# Patient Record
Sex: MALE | Race: WHITE | NOT HISPANIC OR LATINO | Employment: OTHER | URBAN - METROPOLITAN AREA
[De-identification: names, ages, dates, MRNs, and addresses within clinical notes are randomized per-mention and may not be internally consistent; named-entity substitution may affect disease eponyms.]

---

## 2019-03-27 ENCOUNTER — OFFICE VISIT (OUTPATIENT)
Dept: PAIN MEDICINE | Facility: CLINIC | Age: 84
End: 2019-03-27
Payer: MEDICARE

## 2019-03-27 VITALS
DIASTOLIC BLOOD PRESSURE: 77 MMHG | BODY MASS INDEX: 27.31 KG/M2 | SYSTOLIC BLOOD PRESSURE: 132 MMHG | HEIGHT: 67 IN | WEIGHT: 174 LBS | HEART RATE: 70 BPM

## 2019-03-27 DIAGNOSIS — M47.896 OTHER SPONDYLOSIS, LUMBAR REGION: Primary | ICD-10-CM

## 2019-03-27 DIAGNOSIS — M51.36 DDD (DEGENERATIVE DISC DISEASE), LUMBAR: ICD-10-CM

## 2019-03-27 PROCEDURE — 99999 PR PBB SHADOW E&M-NEW PATIENT-LVL III: CPT | Mod: PBBFAC,,, | Performed by: ANESTHESIOLOGY

## 2019-03-27 PROCEDURE — 99204 OFFICE O/P NEW MOD 45 MIN: CPT | Mod: S$PBB,,, | Performed by: ANESTHESIOLOGY

## 2019-03-27 PROCEDURE — 99203 OFFICE O/P NEW LOW 30 MIN: CPT | Mod: PBBFAC,PN | Performed by: ANESTHESIOLOGY

## 2019-03-27 PROCEDURE — 99204 PR OFFICE/OUTPT VISIT, NEW, LEVL IV, 45-59 MIN: ICD-10-PCS | Mod: S$PBB,,, | Performed by: ANESTHESIOLOGY

## 2019-03-27 PROCEDURE — 99999 PR PBB SHADOW E&M-NEW PATIENT-LVL III: ICD-10-PCS | Mod: PBBFAC,,, | Performed by: ANESTHESIOLOGY

## 2019-03-27 RX ORDER — BENZONATATE 200 MG/1
CAPSULE ORAL
Refills: 0 | Status: ON HOLD | COMMUNITY
Start: 2019-01-18 | End: 2019-04-29

## 2019-03-27 RX ORDER — AZITHROMYCIN 250 MG/1
TABLET, FILM COATED ORAL
Refills: 0 | Status: ON HOLD | COMMUNITY
Start: 2019-01-18 | End: 2019-04-29

## 2019-03-27 NOTE — H&P (VIEW-ONLY)
This note was completed with dictation software and grammatical errors may exist.    Referring Physician: Jennifer Wright    PCP: Samuel Valenzuela Jr, MD      CC:  Lower back pain    HPI:   Suraj Parrish is a 83 y.o. male presents to us with lower back pain.  Pain has been present for over 20 years on and off.  Pain is gradually worsened over past 6 months.  Presents to us with constant aching, throbbing pain in his lower back.  Pain radiates to his bilateral hips.  He denies any lower extremity radiation.  Pain worsens with standing, walking and getting up.  Pain improves with rest.  He states having history of minimally invasive spine surgery which provided moderate benefit in the past.  He also has history of lumbar ZEYAD performed by Dr. Sawyer at [facility] in 2010 and 2011 with moderate benefit.  He has not had any recent interventions.  He denies any worsening weakness.  No bowel bladder changes.    ROS:  CONSTITUTIONAL: No fevers, chills, night sweats, wt. loss, appetite changes  SKIN: no rashes or itching  ENT: No headaches, head trauma, vision changes, or eye pain  LYMPH NODES: None noticed   CV: No chest pain, palpitations.   RESP: No shortness of breath, dyspnea on exertion, cough, wheezing, or hemoptysis  GI: No nausea, emesis, diarrhea, constipation, melena, hematochezia, pain.    : No dysuria, hematuria, urgency, or frequency   HEME: No easy bruising, bleeding problems  PSYCHIATRIC: No depression, anxiety, psychosis, hallucinations.  NEURO: No seizures, memory loss, dizziness or difficulty sleeping  MSK:  Positive HPI      Past Medical History:   Diagnosis Date    Hyperlipemia      Past Surgical History:   Procedure Laterality Date    APPENDECTOMY  1958    TONSILLECTOMY, ADENOIDECTOMY  1947     Family History   Problem Relation Age of Onset    Parkinsonism Father      Social History     Socioeconomic History    Marital status: Single     Spouse name: None    Number of children: None  "   Years of education: None    Highest education level: None   Occupational History    None   Social Needs    Financial resource strain: None    Food insecurity:     Worry: None     Inability: None    Transportation needs:     Medical: None     Non-medical: None   Tobacco Use    Smoking status: Former Smoker     Packs/day: 2.00     Years: 15.00     Pack years: 30.00     Types: Cigarettes     Last attempt to quit: 10/17/1963     Years since quittin.4    Smokeless tobacco: Never Used   Substance and Sexual Activity    Alcohol use: Yes     Alcohol/week: 4.2 oz     Types: 7 Glasses of wine per week    Drug use: No    Sexual activity: Yes     Partners: Female   Lifestyle    Physical activity:     Days per week: None     Minutes per session: None    Stress: None   Relationships    Social connections:     Talks on phone: None     Gets together: None     Attends Ep[employer] service: None     Active member of club or organization: None     Attends meetings of clubs or organizations: None     Relationship status: None    Intimate partner violence:     Fear of current or ex partner: None     Emotionally abused: None     Physically abused: None     Forced sexual activity: None   Other Topics Concern    None   Social History Narrative    None         Medications/Allergies: See med card    Vitals:    19 0854   BP: 132/77   Pulse: 70   Weight: 78.9 kg (174 lb)   Height: 5' 7" (1.702 m)   PainSc:   8   PainLoc: Back         Physical exam:    GENERAL: A and O x3, the patient appears well groomed and is in no acute distress.  Skin: No rashes or obvious lesions  HEENT: normocephalic, atraumatic  CARDIOVASCULAR:  Palpable peripheral pulses  LUNGS: easy work of breathing  ABDOMEN: soft, nontender   UPPER EXTREMITIES: Normal alignment, normal range of motion, no atrophy, no skin changes,  hair growth and nail growth normal and equal bilaterally. No swelling, no tenderness.    LOWER EXTREMITIES:  Normal " alignment, normal range of motion, no atrophy, no skin changes,  hair growth and nail growth normal and equal bilaterally. No swelling, no tenderness.    LUMBAR SPINE  Lumbar spine: ROM is limited with flexion extension and oblique extension with moderate increased pain.    Michael's test causes no increased pain on either side.    Supine straight leg raise is negative bilaterally.    Internal and external rotation of the hip causes no increased pain on either side.  Myofascial exam: No tenderness to palpation across lumbar paraspinous muscles.      MENTAL STATUS: normal orientation, speech, language, and fund of knowledge for social situation.  Emotional state appropriate.    CRANIAL NERVES:  II:  PERRL bilaterally,   III,IV,VI: EOMI.    V:  Facial sensation equal bilaterally  VII:  Facial motor function normal.  VIII:  Hearing equal to finger rub bilaterally  IX/X: Gag normal, palate symmetric  XI:  Shoulder shrug equal, head turn equal  XII:  Tongue midline without fasciculations      MOTOR: Tone and bulk: normal bilateral upper and lower Strength: normal   Delt Bi Tri WE WF     R 5 5 5 5 5 5   L 5 5 5 5 5 5     IP ADD ABD Quad TA Gas HAM  R 5 5 5 5 5 5 5  L 5 5 5 5 5 5 5    SENSATION: Light touch and pinprick intact bilaterally  REFLEXES: normal, symmetric, nonbrisk.  Toes down, no clonus. No hoffmans.  GAIT: normal rise, base, steps, and arm swing.        Imaging:  Xray L-spine 2011  A intervertebral joint space narrowing is   noted at the L4-5 and the L5 S1 level similar to previous examination.    Anterior and lateral hypertrophic spurs noted at multiple levels   particularly L3 L4 and L5 and L2.    Assessment:  Patient referred for lower back pain  1. Other spondylosis, lumbar region    2. DDD (degenerative disc disease), lumbar          Plan:  1. I have stressed the importance of physical activity and exercise to improve overall health  2. I believe his low back pain maybe due to facet arthropathy and  have recommended lumbar medial branch blocks as a diagnostic procedure.  If successful, would proceed with radiofrequency ablation.  3. Reviewed pertinent imaging and records with patient  4. Follow up after procedure      Thank you for referring this interesting patient, and I look forward to continuing to collaborate in his care.

## 2019-03-27 NOTE — PROGRESS NOTES
This note was completed with dictation software and grammatical errors may exist.    Referring Physician: Jennifer Wright    PCP: Samuel Valenzuela Jr, MD      CC:  Lower back pain    HPI:   Suraj Parrish is a 83 y.o. male presents to us with lower back pain.  Pain has been present for over 20 years on and off.  Pain is gradually worsened over past 6 months.  Presents to us with constant aching, throbbing pain in his lower back.  Pain radiates to his bilateral hips.  He denies any lower extremity radiation.  Pain worsens with standing, walking and getting up.  Pain improves with rest.  He states having history of minimally invasive spine surgery which provided moderate benefit in the past.  He also has history of lumbar ZEYAD performed by Dr. Sawyer at [facility] in 2010 and 2011 with moderate benefit.  He has not had any recent interventions.  He denies any worsening weakness.  No bowel bladder changes.    ROS:  CONSTITUTIONAL: No fevers, chills, night sweats, wt. loss, appetite changes  SKIN: no rashes or itching  ENT: No headaches, head trauma, vision changes, or eye pain  LYMPH NODES: None noticed   CV: No chest pain, palpitations.   RESP: No shortness of breath, dyspnea on exertion, cough, wheezing, or hemoptysis  GI: No nausea, emesis, diarrhea, constipation, melena, hematochezia, pain.    : No dysuria, hematuria, urgency, or frequency   HEME: No easy bruising, bleeding problems  PSYCHIATRIC: No depression, anxiety, psychosis, hallucinations.  NEURO: No seizures, memory loss, dizziness or difficulty sleeping  MSK:  Positive HPI      Past Medical History:   Diagnosis Date    Hyperlipemia      Past Surgical History:   Procedure Laterality Date    APPENDECTOMY  1958    TONSILLECTOMY, ADENOIDECTOMY  1947     Family History   Problem Relation Age of Onset    Parkinsonism Father      Social History     Socioeconomic History    Marital status: Single     Spouse name: None    Number of children: None  "   Years of education: None    Highest education level: None   Occupational History    None   Social Needs    Financial resource strain: None    Food insecurity:     Worry: None     Inability: None    Transportation needs:     Medical: None     Non-medical: None   Tobacco Use    Smoking status: Former Smoker     Packs/day: 2.00     Years: 15.00     Pack years: 30.00     Types: Cigarettes     Last attempt to quit: 10/17/1963     Years since quittin.4    Smokeless tobacco: Never Used   Substance and Sexual Activity    Alcohol use: Yes     Alcohol/week: 4.2 oz     Types: 7 Glasses of wine per week    Drug use: No    Sexual activity: Yes     Partners: Female   Lifestyle    Physical activity:     Days per week: None     Minutes per session: None    Stress: None   Relationships    Social connections:     Talks on phone: None     Gets together: None     Attends Temple service: None     Active member of club or organization: None     Attends meetings of clubs or organizations: None     Relationship status: None    Intimate partner violence:     Fear of current or ex partner: None     Emotionally abused: None     Physically abused: None     Forced sexual activity: None   Other Topics Concern    None   Social History Narrative    None         Medications/Allergies: See med card    Vitals:    19 0854   BP: 132/77   Pulse: 70   Weight: 78.9 kg (174 lb)   Height: 5' 7" (1.702 m)   PainSc:   8   PainLoc: Back         Physical exam:    GENERAL: A and O x3, the patient appears well groomed and is in no acute distress.  Skin: No rashes or obvious lesions  HEENT: normocephalic, atraumatic  CARDIOVASCULAR:  Palpable peripheral pulses  LUNGS: easy work of breathing  ABDOMEN: soft, nontender   UPPER EXTREMITIES: Normal alignment, normal range of motion, no atrophy, no skin changes,  hair growth and nail growth normal and equal bilaterally. No swelling, no tenderness.    LOWER EXTREMITIES:  Normal " alignment, normal range of motion, no atrophy, no skin changes,  hair growth and nail growth normal and equal bilaterally. No swelling, no tenderness.    LUMBAR SPINE  Lumbar spine: ROM is limited with flexion extension and oblique extension with moderate increased pain.    Michael's test causes no increased pain on either side.    Supine straight leg raise is negative bilaterally.    Internal and external rotation of the hip causes no increased pain on either side.  Myofascial exam: No tenderness to palpation across lumbar paraspinous muscles.      MENTAL STATUS: normal orientation, speech, language, and fund of knowledge for social situation.  Emotional state appropriate.    CRANIAL NERVES:  II:  PERRL bilaterally,   III,IV,VI: EOMI.    V:  Facial sensation equal bilaterally  VII:  Facial motor function normal.  VIII:  Hearing equal to finger rub bilaterally  IX/X: Gag normal, palate symmetric  XI:  Shoulder shrug equal, head turn equal  XII:  Tongue midline without fasciculations      MOTOR: Tone and bulk: normal bilateral upper and lower Strength: normal   Delt Bi Tri WE WF     R 5 5 5 5 5 5   L 5 5 5 5 5 5     IP ADD ABD Quad TA Gas HAM  R 5 5 5 5 5 5 5  L 5 5 5 5 5 5 5    SENSATION: Light touch and pinprick intact bilaterally  REFLEXES: normal, symmetric, nonbrisk.  Toes down, no clonus. No hoffmans.  GAIT: normal rise, base, steps, and arm swing.        Imaging:  Xray L-spine 2011  A intervertebral joint space narrowing is   noted at the L4-5 and the L5 S1 level similar to previous examination.    Anterior and lateral hypertrophic spurs noted at multiple levels   particularly L3 L4 and L5 and L2.    Assessment:  Patient referred for lower back pain  1. Other spondylosis, lumbar region    2. DDD (degenerative disc disease), lumbar          Plan:  1. I have stressed the importance of physical activity and exercise to improve overall health  2. I believe his low back pain maybe due to facet arthropathy and  have recommended lumbar medial branch blocks as a diagnostic procedure.  If successful, would proceed with radiofrequency ablation.  3. Reviewed pertinent imaging and records with patient  4. Follow up after procedure      Thank you for referring this interesting patient, and I look forward to continuing to collaborate in his care.

## 2019-03-28 DIAGNOSIS — M47.896 OTHER SPONDYLOSIS, LUMBAR REGION: Primary | ICD-10-CM

## 2019-04-11 ENCOUNTER — HOSPITAL ENCOUNTER (OUTPATIENT)
Facility: AMBULARY SURGERY CENTER | Age: 84
Discharge: HOME | End: 2019-04-11
Attending: ANESTHESIOLOGY | Admitting: ANESTHESIOLOGY
Payer: MEDICARE

## 2019-04-11 DIAGNOSIS — M47.896 OTHER SPONDYLOSIS, LUMBAR REGION: Primary | ICD-10-CM

## 2019-04-11 PROCEDURE — 64493 INJ PARAVERT F JNT L/S 1 LEV: CPT | Mod: LT | Performed by: ANESTHESIOLOGY

## 2019-04-11 PROCEDURE — 64493 INJ PARAVERT F JNT L/S 1 LEV: CPT | Mod: 50,,, | Performed by: ANESTHESIOLOGY

## 2019-04-11 PROCEDURE — 64494 INJ PARAVERT F JNT L/S 2 LEV: CPT | Mod: LT | Performed by: ANESTHESIOLOGY

## 2019-04-11 PROCEDURE — 64494 PR INJ DX/THER AGNT PARAVERT FACET JOINT,IMG GUIDE,LUMBAR/SAC, 2ND LEVEL: ICD-10-PCS | Mod: 50,,, | Performed by: ANESTHESIOLOGY

## 2019-04-11 PROCEDURE — 64493 PR INJ DX/THER AGNT PARAVERT FACET JOINT,IMG GUIDE,LUMBAR/SAC,1ST LVL: ICD-10-PCS | Mod: 50,,, | Performed by: ANESTHESIOLOGY

## 2019-04-11 PROCEDURE — 64494 INJ PARAVERT F JNT L/S 2 LEV: CPT | Mod: 50,,, | Performed by: ANESTHESIOLOGY

## 2019-04-11 RX ORDER — SODIUM CHLORIDE, SODIUM LACTATE, POTASSIUM CHLORIDE, CALCIUM CHLORIDE 600; 310; 30; 20 MG/100ML; MG/100ML; MG/100ML; MG/100ML
INJECTION, SOLUTION INTRAVENOUS ONCE AS NEEDED
Status: DISCONTINUED | OUTPATIENT
Start: 2019-04-11 | End: 2019-04-11 | Stop reason: HOSPADM

## 2019-04-11 RX ORDER — BUPIVACAINE HYDROCHLORIDE 5 MG/ML
INJECTION, SOLUTION EPIDURAL; INTRACAUDAL
Status: DISCONTINUED | OUTPATIENT
Start: 2019-04-11 | End: 2019-04-11 | Stop reason: HOSPADM

## 2019-04-11 NOTE — DISCHARGE SUMMARY
[facility]  Discharge Note  Short Stay    Admit Date: 4/11/2019    Discharge Date and Time: 4/11/2019    Attending Physician: Valente Montague MD     Discharge Provider: Valente Montague    Diagnoses:  Active Hospital Problems    Diagnosis  POA    *Other spondylosis, lumbar region [M47.896]  Yes      Resolved Hospital Problems   No resolved problems to display.       Hospital Course: Lumbar MBB  Discharged Condition: Good    Final Diagnoses:   Active Hospital Problems    Diagnosis  POA    *Other spondylosis, lumbar region [M47.896]  Yes      Resolved Hospital Problems   No resolved problems to display.       Disposition: Home or Self Care    Follow up/Patient Instructions:    Medications:  Reconciled Home Medications:      Medication List      CONTINUE taking these medications    aspirin 81 MG EC tablet  Commonly known as:  ECOTRIN  Take 81 mg by mouth once daily.     atorvastatin 10 MG tablet  Commonly known as:  LIPITOR  Take 10 mg by mouth once daily.     azithromycin 250 MG tablet  Commonly known as:  Z-SHORTY     benzonatate 200 MG capsule  Commonly known as:  TESSALON  TK ONE C PO TID FOR 10 DAYS PRN FOR COUGH     omeprazole 40 MG capsule  Commonly known as:  PRILOSEC  Take 1 capsule (40 mg total) by mouth once daily.     VITAMIN A ORAL  Take by mouth.          Discharge Procedure Orders   Call MD for:  temperature >100.4     Call MD for:  persistent nausea and vomiting or diarrhea     Call MD for:  severe uncontrolled pain     Call MD for:  redness, tenderness, or signs of infection (pain, swelling, redness, odor or green/yellow discharge around incision site)     Call MD for:  difficulty breathing or increased cough     Call MD for:  severe persistent headache        Follow up with MD in 2-3 weeks    Discharge Procedure Orders (must include Diet, Follow-up, Activity):   Discharge Procedure Orders (must include Diet, Follow-up, Activity)   Call MD for:  temperature >100.4     Call MD for:  persistent nausea and  vomiting or diarrhea     Call MD for:  severe uncontrolled pain     Call MD for:  redness, tenderness, or signs of infection (pain, swelling, redness, odor or green/yellow discharge around incision site)     Call MD for:  difficulty breathing or increased cough     Call MD for:  severe persistent headache

## 2019-04-11 NOTE — PLAN OF CARE
Stable, states ready to go home, dennise po fluids, denies pain, able  To hold legs off bed, ambulated to car with sister, belongings given to pt  
left thigh organized hematoma

## 2019-04-11 NOTE — OP NOTE
PROCEDURE DATE: 4/11/2019    PROCEDURE:  Bilateral L3,4,5 medial branch nerve blocks    DIAGNOSIS:  Other lumbar spondylosis    Post Op diagnosis: Same    PHYSICIAN: Valente Montague MD    MEDICATIONS INJECTED: 0.5% bupivicaine, 0.5 ml at each level    SEDATION MEDICATIONS:None    LOCAL ANESTHETIC USED: None    ESTIMATED BLOOD LOSS:  None    COMPLICATIONS:  None    TECHNIQUE: A time out was taken to identify the patient, procedure and side of the procedure. The patient was placed in a prone position, then prepped and draped in the usual sterile fashion using ChloraPrep and sterile towels.  The levels were determined under fluoroscopic guidance and then marked.  A 25-gauge 3.5 inch needle was introduced to the anatomic location of the L3,4,5 medial branch nerves on the bilateral side. The above medication was then injected. The patient tolerated the procedure well.     The patient was monitored after the procedure. Patient was given pain diary to record pain levels at home.     If found to have greater than a 50% recovery and so will be scheduled for a radiofrequency ablation of the corresponding nerves.  Patient was given post procedure and discharge instructions to follow at home.  The patient was discharged in a stable condition.

## 2019-04-12 ENCOUNTER — PATIENT MESSAGE (OUTPATIENT)
Dept: PAIN MEDICINE | Facility: CLINIC | Age: 84
End: 2019-04-12

## 2019-04-12 VITALS
WEIGHT: 174 LBS | DIASTOLIC BLOOD PRESSURE: 78 MMHG | TEMPERATURE: 98 F | HEIGHT: 67 IN | HEART RATE: 76 BPM | OXYGEN SATURATION: 95 % | SYSTOLIC BLOOD PRESSURE: 142 MMHG | RESPIRATION RATE: 20 BRPM | BODY MASS INDEX: 27.31 KG/M2

## 2019-04-15 ENCOUNTER — TELEPHONE (OUTPATIENT)
Dept: PAIN MEDICINE | Facility: CLINIC | Age: 84
End: 2019-04-15

## 2019-04-15 DIAGNOSIS — M47.896 OTHER SPONDYLOSIS, LUMBAR REGION: Primary | ICD-10-CM

## 2019-04-15 NOTE — TELEPHONE ENCOUNTER
Patient reports 80% or greater decrease in pain following bilateral L3,4,5 Medial Branch Block done on 4/11/19. Patient scheduled on 4/29/19 for bilateral L3,4,5 Radiofrequency Ablation. Instructions given. .

## 2019-04-20 ENCOUNTER — PATIENT MESSAGE (OUTPATIENT)
Dept: SURGERY | Facility: AMBULARY SURGERY CENTER | Age: 84
End: 2019-04-20

## 2019-04-29 ENCOUNTER — HOSPITAL ENCOUNTER (OUTPATIENT)
Facility: AMBULARY SURGERY CENTER | Age: 84
Discharge: HOME | End: 2019-04-29
Attending: ANESTHESIOLOGY | Admitting: ANESTHESIOLOGY
Payer: MEDICARE

## 2019-04-29 DIAGNOSIS — M47.896 OTHER SPONDYLOSIS, LUMBAR REGION: Primary | ICD-10-CM

## 2019-04-29 PROCEDURE — 64635 DESTROY LUMB/SAC FACET JNT: CPT | Mod: 50,,, | Performed by: ANESTHESIOLOGY

## 2019-04-29 PROCEDURE — 99152 PR MOD CONSCIOUS SEDATION, SAME PHYS, 5+ YRS, FIRST 15 MIN: ICD-10-PCS | Mod: ,,, | Performed by: ANESTHESIOLOGY

## 2019-04-29 PROCEDURE — 64636 DESTROY L/S FACET JNT ADDL: CPT | Mod: 50,,, | Performed by: ANESTHESIOLOGY

## 2019-04-29 PROCEDURE — 64635 PR DESTROY LUMB/SAC FACET JNT: ICD-10-PCS | Mod: 50,,, | Performed by: ANESTHESIOLOGY

## 2019-04-29 PROCEDURE — 64636 DESTROY L/S FACET JNT ADDL: CPT | Mod: LT | Performed by: ANESTHESIOLOGY

## 2019-04-29 PROCEDURE — 64635 DESTROY LUMB/SAC FACET JNT: CPT | Mod: LT | Performed by: ANESTHESIOLOGY

## 2019-04-29 PROCEDURE — 64636 PR DESTROY L/S FACET JNT ADDL: ICD-10-PCS | Mod: 50,,, | Performed by: ANESTHESIOLOGY

## 2019-04-29 PROCEDURE — 99152 MOD SED SAME PHYS/QHP 5/>YRS: CPT | Mod: ,,, | Performed by: ANESTHESIOLOGY

## 2019-04-29 RX ORDER — BUPIVACAINE HYDROCHLORIDE 2.5 MG/ML
INJECTION, SOLUTION EPIDURAL; INFILTRATION; INTRACAUDAL
Status: DISCONTINUED | OUTPATIENT
Start: 2019-04-29 | End: 2019-04-29 | Stop reason: HOSPADM

## 2019-04-29 RX ORDER — METHYLPREDNISOLONE ACETATE 80 MG/ML
INJECTION, SUSPENSION INTRA-ARTICULAR; INTRALESIONAL; INTRAMUSCULAR; SOFT TISSUE
Status: DISCONTINUED | OUTPATIENT
Start: 2019-04-29 | End: 2019-04-29 | Stop reason: HOSPADM

## 2019-04-29 RX ORDER — LIDOCAINE HYDROCHLORIDE 20 MG/ML
INJECTION, SOLUTION EPIDURAL; INFILTRATION; INTRACAUDAL; PERINEURAL
Status: DISCONTINUED | OUTPATIENT
Start: 2019-04-29 | End: 2019-04-29 | Stop reason: HOSPADM

## 2019-04-29 RX ORDER — MIDAZOLAM HYDROCHLORIDE 2 MG/2ML
INJECTION, SOLUTION INTRAMUSCULAR; INTRAVENOUS
Status: DISCONTINUED | OUTPATIENT
Start: 2019-04-29 | End: 2019-04-29 | Stop reason: HOSPADM

## 2019-04-29 RX ORDER — SODIUM CHLORIDE, SODIUM LACTATE, POTASSIUM CHLORIDE, CALCIUM CHLORIDE 600; 310; 30; 20 MG/100ML; MG/100ML; MG/100ML; MG/100ML
INJECTION, SOLUTION INTRAVENOUS CONTINUOUS PRN
Status: DISCONTINUED | OUTPATIENT
Start: 2019-04-29 | End: 2019-04-29 | Stop reason: HOSPADM

## 2019-04-29 RX ORDER — METHYLPREDNISOLONE ACETATE 80 MG/ML
INJECTION, SUSPENSION INTRA-ARTICULAR; INTRALESIONAL; INTRAMUSCULAR; SOFT TISSUE
Status: DISCONTINUED
Start: 2019-04-29 | End: 2019-04-29 | Stop reason: HOSPADM

## 2019-04-29 RX ORDER — SODIUM CHLORIDE, SODIUM LACTATE, POTASSIUM CHLORIDE, CALCIUM CHLORIDE 600; 310; 30; 20 MG/100ML; MG/100ML; MG/100ML; MG/100ML
INJECTION, SOLUTION INTRAVENOUS ONCE AS NEEDED
Status: COMPLETED | OUTPATIENT
Start: 2019-04-29 | End: 2019-04-29

## 2019-04-29 RX ORDER — LIDOCAINE HYDROCHLORIDE 10 MG/ML
INJECTION, SOLUTION EPIDURAL; INFILTRATION; INTRACAUDAL; PERINEURAL
Status: DISCONTINUED | OUTPATIENT
Start: 2019-04-29 | End: 2019-04-29 | Stop reason: HOSPADM

## 2019-04-29 RX ORDER — FENTANYL CITRATE 50 UG/ML
INJECTION, SOLUTION INTRAMUSCULAR; INTRAVENOUS
Status: DISCONTINUED | OUTPATIENT
Start: 2019-04-29 | End: 2019-04-29 | Stop reason: HOSPADM

## 2019-04-29 RX ADMIN — SODIUM CHLORIDE, SODIUM LACTATE, POTASSIUM CHLORIDE, CALCIUM CHLORIDE: 600; 310; 30; 20 INJECTION, SOLUTION INTRAVENOUS at 01:04

## 2019-04-29 NOTE — H&P
CC: lower back pain    HPI: The patient is a 83 y.o. male with a history of lumbar spondylosis here for lumbar MB RFA. There are no major changes in history and physical from 3/27/19 by myself.    Past Medical History:   Diagnosis Date    Hyperlipemia        Past Surgical History:   Procedure Laterality Date    APPENDECTOMY      Block-nerve-medial branch-lumbar L3,4,5 Bilateral 2019    [facility] Val[facility]ague [facility]    TONSILLECTOMY, ADENOIDECTOMY         Family History   Problem Relation Age of Onset    Parkinsonism Father        Social History     Socioeconomic History    Marital status: Single     Spouse name: Not on file    Number of children: Not on file    Years of education: Not on file    Highest education level: Not on file   Occupational History    Not on file   Social Needs    Financial resource strain: Not on file    Food insecurity:     Worry: Not on file     Inability: Not on file    Transportation needs:     Medical: Not on file     Non-medical: Not on file   Tobacco Use    Smoking status: Former Smoker     Packs/day: 2.00     Years: 15.00     Pack years: 30.00     Types: Cigarettes     Last attempt to quit: 10/17/1963     Years since quittin.5    Smokeless tobacco: Never Used   Substance and Sexual Activity    Alcohol use: Yes     Alcohol/week: 4.2 oz     Types: 7 Glasses of wine per week    Drug use: No    Sexual activity: Yes     Partners: Female   Lifestyle    Physical activity:     Days per week: Not on file     Minutes per session: Not on file    Stress: Not on file   Relationships    Social connections:     Talks on phone: Not on file     Gets together: Not on file     Attends Alevism service: Not on file     Active member of club or organization: Not on file     Attends meetings of clubs or organizations: Not on file     Relationship status: Not on file   Other Topics Concern    Not on file   Social History Narrative    Not on file       No  "current facility-administered medications for this encounter.        Review of patient's allergies indicates:  No Known Allergies    Vitals:    04/26/19 0705 04/29/19 1330   BP:  (!) 146/80   Pulse:  70   Resp:  19   Temp:  98.2 °F (36.8 °C)   TempSrc:  Skin   SpO2:  97%   Weight: 78.9 kg (174 lb)    Height: 5' 7" (1.702 m)        REVIEW OF SYSTEMS:     GENERAL: No weight loss, malaise or fevers.  HEENT:  No recent changes in vision or hearing  NECK: Negative for lumps, no difficulty with swallowing.  RESPIRATORY: Negative for cough, wheezing or shortness of breath, patient denies any recent URI.  CARDIOVASCULAR: Negative for chest pain, leg swelling or palpitations.  GI: Negative for abdominal discomfort, blood in stools or black stools or change in bowel habits.  MUSCULOSKELETAL: See HPI.  SKIN: Negative for lesions, rash, and itching.  PSYCH: No suicidal or homicidal ideations, no current mood disturbances.  HEMATOLOGY/LYMPHOLOGY: Negative for prolonged bleeding, bruising easily or swollen nodes. Patient is not currently taking any anti-coagulants  ENDO: No history of diabetes or thyroid dysfunction  NEURO: No history of syncope, paralysis, seizures or tremors.All other reviewed and negative other than HPI.    Physical exam:  Gen: A and O x3, pleasant, well-groomed  Skin: No rashes or obvious lesions  HEENT: PERRLA, no obvious deformities on ears or in canals. No thyroid masses, trachea midline, no palpable lymph nodes in neck, axilla.  CVS: Regular rate and rhythm, normal S1 and S2, no murmurs.  Resp: Clear to auscultation bilaterally.  Abdomen: Soft, NT/ND, normal bowel sounds present.  Musculoskeletal/Neuro: Moving all extremities    Assessment:  Other spondylosis, lumbar region  -     Case Request Operating Room: Radiofrequency Ablation, Nerve, Spinal, Lumbar, Medial Branch, L3,4,5  -     Place in Outpatient; Standing  -     Vital signs; Standing  -     Insert peripheral IV; Standing  -     Verify informed " consent; Standing  -     Notify physician ; Standing  -     Notify physician ; Standing  -     Notify physician (specify); Standing  -     Diet NPO; Standing    Other orders  -     lactated ringers infusion  -     IP VTE LOW RISK PATIENT; Standing          PLAN: Lumbar MB RFA      This patient has been cleared for surgery in an ambulatory surgical facility    ASA 3,  Mallampatti Score 3  No history of anesthetic complications  Plan for RN IV sedation

## 2019-04-29 NOTE — DISCHARGE INSTRUCTIONS
Recovery After Procedural Sedation (Adult)  You have been given medicine by vein to make you sleep during your surgery. This may have included both a pain medicine and sleeping medicine. Most of the effects have worn off. But you may still have some drowsiness for the next 6 to 8 hours.  Home care  Follow these guidelines when you get home:  · For the next 8 hours, you should be watched by a responsible adult. This person should make sure your condition is not getting worse.  · Don't drink any alcohol for the next 24 hours.  · Don't drive, operate dangerous machinery, or make important business or personal decisions during the next 24 hours.  Note: Your healthcare provider may tell you not to take any medicine by mouth for pain or sleep in the next 4 hours. These medicines may react with the medicines you were given in the hospital. This could cause a much stronger response than usual.  Follow-up care  Follow up with your healthcare provider if you are not alert and back to your usual level of activity within 12 hours.  When to seek medical advice  Call your healthcare provider right away if any of these occur:  · Drowsiness gets worse  · Weakness or dizziness gets worse  · Repeated vomiting  · You can't be awakened   Date Last Reviewed: 10/18/2016  © 9079-4235 [employer]. [address] [place] [address], [place] [address]. All rights reserved. This information is not intended as a substitute for professional medical care. Always follow your healthcare professional's instructions.      Before leaving, please make sure you have all your personal belongings such as glasses, purses, wallets, keys, cell phones, jewelry, jackets etc Radiofrequency Thermocoagulation Recovery at Home    RADIOFREQUENCY/Pain injection instructions:     This procedure may take several weeks to relieve pain  You may get some pain relief from the local anesthetic initally.    No driving for 24 hrs.   Activity as tolerated- gradually  increase activities.  Dont lift over 10 lbs for 24 hrs   No heat at injection sites x 2 days. No heating pads, hot tubs, saunas, or swimming in any body of water or pool for 2 days.  Use ice pack for mild swelling and for comfort , apply for 20 minutes, remove for 20 minute intervals. No direct contact of ice itself  to skin.  May shower today.  Do not allow shower water to beat on injection sites for 2 days.No tub baths for two days.      Resume Aspirin, Plavix, or Coumadin the day after the procedure unless otherwise instructed.   If diabetic,monitor your glucose carefully as steroids can increase your glucose level    Seek immediate medical help for:   Severe increase in your usual pain or appearance of new pain.  Prolonged (more than 8 hours) or increasing weakness or numbness in the legs or arms. Numbing medicine was injected and can affect the messages to and from the brain and legs or arms.  .    Fever above 100.4F,Drainage,redness,active bleeding, or increased swelling at the injection site.  Headache, shortness of breath, chest pain, or breathing problems.  What to know about pain relief  An injection to reduce inflammation takes a few days to work, sometimes even up to a week. There may even be more pain at first.  Tips for recovery  · You may use an ice pack at your injection site for comfort.  · You may shower this evening.   · Do not use a heating pad or take tub baths or swim for 2 days.  · Take your usual medication for pain if needed.  · Gradually increase your activities.  · Dont lift anything over 10 lbs for the first 24 hours  · Dont drive the day of the procedure.  · Wait until tomorrow to resume any blood thinners (aspirin, Plavix, Coumadin) but you may resume all your other medications today.  When to call your doctor  Call right away if you notice any of the following symptoms:  · Severe pain or headache  · Fever or chills  · Redness or swelling around the injection site   · Difficulty  breathing  · Vomiting  · Increasing numbness or weakness in arms or legs  · If you are diabetic, a steroid injection can increase your blood sugar so moniter it carefully.

## 2019-04-29 NOTE — OP NOTE
"PROCEDURE DATE: 4/29/2019    PROCEDURE:  Radiofrequency ablation of the L3,4,5 medial branch nerves on the bilateral-side utilizing fluoroscopy    DIAGNOSIS:  Other lumbar spondylosis  Post op Diagnosis: Same    PHYSICIAN: Valente Montague MD    MEDICATIONS INJECTED:  From a mixture of 6ml of 0.25% bupivicaine and 80mg of methylprednisone,  1ml of this solution was injected at each level.    LOCAL ANESTHETIC USED: Lidocaine 1%, 2 ml given at each site.    SEDATION MEDICATIONS: RN IV sedation    ESTIMATED BLOOD LOSS:  None    COMPLICATIONS:  None    TECHNIQUE:  A time out was taken to identify patient and procedure side prior to starting the procedure. Laying in a prone position, the patient was prepped and draped in the usual sterile fashion using ChloraPrep and sterile towels.  The levels were determined under fluoroscopic guidance and then marked.  Local anesthetic was given by raising a wheal at the skin over each site and then infiltrated approximately 2cm deeper.  A 20-gauge  100mm "venom" RF needle was introduced to the anatomic location of the bilateral L3,4,5 medial branch nerves.  Motor stimulation up to 2 Volts at each level confirmed no motor nerve involvement.  Impedance was less than 800 ohms at each level.  1ml of 2% lidocaine was instilled prior to lesioning.  Ablation was performed per level utilizing radiofrequency generator 80°C for 90 seconds. The above noted medication was then injected slowly. The patient tolerated the procedure well.     The patient was monitored after the procedure.  Patient was given post procedure and discharge instructions to follow at home.  The patient was discharged in a stable condition    "

## 2019-04-29 NOTE — PLAN OF CARE
Patient sitting in chair and states he is ready to go home; able to standup from chair to transfer with very minimal assistance from chair to wheelchair. Patient denies pain,dizziness,nausea or weakness. Patient's sister is presert at chairside and states she is ready to take patient home; she states she is driving the patient home. All belongings on pre-op checklist have been returned to patient.

## 2019-04-30 VITALS
TEMPERATURE: 99 F | WEIGHT: 174 LBS | HEART RATE: 72 BPM | OXYGEN SATURATION: 98 % | HEIGHT: 67 IN | RESPIRATION RATE: 18 BRPM | BODY MASS INDEX: 27.31 KG/M2 | DIASTOLIC BLOOD PRESSURE: 78 MMHG | SYSTOLIC BLOOD PRESSURE: 142 MMHG

## 2019-05-06 ENCOUNTER — TELEPHONE (OUTPATIENT)
Dept: PAIN MEDICINE | Facility: CLINIC | Age: 84
End: 2019-05-06

## 2019-05-06 NOTE — TELEPHONE ENCOUNTER
----- Message from Hussain Shafer sent at 5/6/2019 10:20 AM CDT -----  Contact: same  Patient called in and stated he had a procedure done on 4/29/19.  Patient stated he drove for 5 hours to a wedding on 5/5/19 and then sat for 1/2 hour in [employer].  Patient stated the pain was so bad the entire time.  Patient wanted to see if there was something he can do or if he can get OTC pain patches to help?    Patient call back number is [phone]

## 2019-05-06 NOTE — TELEPHONE ENCOUNTER
Pt called in c/o pain following procedure. Pt was informed this could take some time to feel better. He will try the OTC patches and NSAIDS to help and keep us posted.

## 2019-05-15 ENCOUNTER — OFFICE VISIT (OUTPATIENT)
Dept: PAIN MEDICINE | Facility: CLINIC | Age: 84
End: 2019-05-15
Payer: MEDICARE

## 2019-05-15 VITALS
BODY MASS INDEX: 27.31 KG/M2 | HEART RATE: 74 BPM | WEIGHT: 174 LBS | DIASTOLIC BLOOD PRESSURE: 76 MMHG | SYSTOLIC BLOOD PRESSURE: 134 MMHG | HEIGHT: 67 IN

## 2019-05-15 DIAGNOSIS — M51.36 DDD (DEGENERATIVE DISC DISEASE), LUMBAR: Primary | ICD-10-CM

## 2019-05-15 DIAGNOSIS — M54.16 LUMBAR RADICULOPATHY: Primary | ICD-10-CM

## 2019-05-15 DIAGNOSIS — M54.16 LUMBAR RADICULITIS: ICD-10-CM

## 2019-05-15 DIAGNOSIS — M47.896 OTHER SPONDYLOSIS, LUMBAR REGION: ICD-10-CM

## 2019-05-15 DIAGNOSIS — M96.1 POSTLAMINECTOMY SYNDROME OF LUMBAR REGION: ICD-10-CM

## 2019-05-15 PROCEDURE — 99999 PR PBB SHADOW E&M-EST. PATIENT-LVL III: CPT | Mod: PBBFAC,,, | Performed by: ANESTHESIOLOGY

## 2019-05-15 PROCEDURE — 99214 OFFICE O/P EST MOD 30 MIN: CPT | Mod: S$PBB,,, | Performed by: ANESTHESIOLOGY

## 2019-05-15 PROCEDURE — 99214 PR OFFICE/OUTPT VISIT, EST, LEVL IV, 30-39 MIN: ICD-10-PCS | Mod: S$PBB,,, | Performed by: ANESTHESIOLOGY

## 2019-05-15 PROCEDURE — 99999 PR PBB SHADOW E&M-EST. PATIENT-LVL III: ICD-10-PCS | Mod: PBBFAC,,, | Performed by: ANESTHESIOLOGY

## 2019-05-15 PROCEDURE — 99213 OFFICE O/P EST LOW 20 MIN: CPT | Mod: PBBFAC,PN | Performed by: ANESTHESIOLOGY

## 2019-05-15 NOTE — H&P (VIEW-ONLY)
This note was completed with dictation software and grammatical errors may exist.    Referring Physician: No ref. provider found    PCP: Samuel Valenzuela Jr, MD      CC:  Lower back pain    Interval history:  Patient returns to our clinic.  He underwent a lumbar MB RFA procedure performed on April 29, 2019.  He reports minimal benefit of his lower back pain. Pain is a constant aching, throbbing pain in his lower back.  Pain radiates to his bilateral hips, left greater than right.  Does have history of a lumbar laminectomy performed L4-5 perform at the [facility] [place].  Prior to surgery, he has had moderate benefit from lumbar ZEYAD performed in the past.  He denies any worsening weakness.  No bowel bladder changes.  Prior HPI:   Suraj Parrish is a 83 y.o. male presents to us with lower back pain.  Pain has been present for over 20 years on and off.  Pain is gradually worsened over past 6 months.  Presents to us with constant aching, throbbing pain in his lower back.  Pain radiates to his bilateral hips.  He denies any lower extremity radiation.  Pain worsens with standing, walking and getting up.  Pain improves with rest.  He states having history of minimally invasive spine surgery which provided moderate benefit in the past.  He also has history of lumbar ZEYAD performed by Dr. Sawyer at [facility] in 2010 and 2011 with moderate benefit.  He has not had any recent interventions.  He denies any worsening weakness.  No bowel bladder changes.    ROS:  CONSTITUTIONAL: No fevers, chills, night sweats, wt. loss, appetite changes  SKIN: no rashes or itching  ENT: No headaches, head trauma, vision changes, or eye pain  LYMPH NODES: None noticed   CV: No chest pain, palpitations.   RESP: No shortness of breath, dyspnea on exertion, cough, wheezing, or hemoptysis  GI: No nausea, emesis, diarrhea, constipation, melena, hematochezia, pain.    : No dysuria, hematuria, urgency, or frequency   HEME: No easy  bruising, bleeding problems  PSYCHIATRIC: No depression, anxiety, psychosis, hallucinations.  NEURO: No seizures, memory loss, dizziness or difficulty sleeping  MSK:  Positive HPI      Past Medical History:   Diagnosis Date    Hyperlipemia      Past Surgical History:   Procedure Laterality Date    APPENDECTOMY      Block-nerve-medial branch-lumbar L3,4,5 Bilateral 2019    [facility] at [facility]    Radiofrequency Ablation, Nerve, Spinal, Lumbar, Medial Branch, L3,4,5 Bilateral 2019    Per[facility] by [facility] at [facility] OR    TONSILLECTOMY, ADENOIDECTOMY       Family History   Problem Relation Age of Onset    Parkinsonism Father      Social History     Socioeconomic History    Marital status: Single     Spouse name: Not on file    Number of children: Not on file    Years of education: Not on file    Highest education level: Not on file   Occupational History    Not on file   Social Needs    Financial resource strain: Not on file    Food insecurity:     Worry: Not on file     Inability: Not on file    Transportation needs:     Medical: Not on file     Non-medical: Not on file   Tobacco Use    Smoking status: Former Smoker     Packs/day: 2.00     Years: 15.00     Pack years: 30.00     Types: Cigarettes     Last attempt to quit: 10/17/1963     Years since quittin.6    Smokeless tobacco: Never Used   Substance and Sexual Activity    Alcohol use: Yes     Alcohol/week: 4.2 oz     Types: 7 Glasses of wine per week    Drug use: No    Sexual activity: Yes     Partners: Female   Lifestyle    Physical activity:     Days per week: Not on file     Minutes per session: Not on file    Stress: Not on file   Relationships    Social connections:     Talks on phone: Not on file     Gets together: Not on file     Attends [employer] service: Not on file     Active member of club or organization: Not on file     Attends meetings of clubs or organizations: Not on file      "Relationship status: Not on file   Other Topics Concern    Not on file   Social History Narrative    Not on file         Medications/Allergies: See med card    Vitals:    05/15/19 1335   BP: 134/76   Pulse: 74   Weight: 78.9 kg (174 lb)   Height: 5' 7" (1.702 m)   PainSc: 10-Worst pain ever   PainLoc: Back         Physical exam:    GENERAL: A and O x3, the patient appears well groomed and is in no acute distress.  Skin: No rashes or obvious lesions  HEENT: normocephalic, atraumatic  CARDIOVASCULAR:  Palpable peripheral pulses  LUNGS: easy work of breathing  ABDOMEN: soft, nontender   UPPER EXTREMITIES: Normal alignment, normal range of motion, no atrophy, no skin changes,  hair growth and nail growth normal and equal bilaterally. No swelling, no tenderness.    LOWER EXTREMITIES:  Normal alignment, normal range of motion, no atrophy, no skin changes,  hair growth and nail growth normal and equal bilaterally. No swelling, no tenderness.    LUMBAR SPINE  Lumbar spine: ROM is limited with flexion extension and oblique extension with moderate increased pain.    Michael's test causes no increased pain on either side.    Supine straight leg raise is negative bilaterally.    Internal and external rotation of the hip causes no increased pain on either side.  Myofascial exam: No tenderness to palpation across lumbar paraspinous muscles.      MENTAL STATUS: normal orientation, speech, language, and fund of knowledge for social situation.  Emotional state appropriate.    CRANIAL NERVES:  II:  PERRL bilaterally,   III,IV,VI: EOMI.    V:  Facial sensation equal bilaterally  VII:  Facial motor function normal.  VIII:  Hearing equal to finger rub bilaterally  IX/X: Gag normal, palate symmetric  XI:  Shoulder shrug equal, head turn equal  XII:  Tongue midline without fasciculations      MOTOR: Tone and bulk: normal bilateral upper and lower Strength: normal "   Delt Bi Tri WE WF     R 5 5 5 5 5 5   L 5 5 5 5 5 5     IP ADD ABD Quad TA Gas HAM  R 5 5 5 5 5 5 5  L 5 5 5 5 5 5 5    SENSATION: Light touch and pinprick intact bilaterally  REFLEXES: normal, symmetric, nonbrisk.  Toes down, no clonus. No hoffmans.  GAIT: normal rise, base, steps, and arm swing.        Imaging:  Xray L-spine 2011  A intervertebral joint space narrowing is   noted at the L4-5 and the L5 S1 level similar to previous examination.    Anterior and lateral hypertrophic spurs noted at multiple levels   particularly L3 L4 and L5 and L2.    Assessment:  Patient referred for lower back pain  1. DDD (degenerative disc disease), lumbar    2. Lumbar radiculitis    3. Other spondylosis, lumbar region    4. Postlaminectomy syndrome of lumbar region          Plan:  1. I have stressed the importance of physical activity and exercise to improve overall health  2. I think that the patient's back pain and radicular leg symptoms are due to degenerative disc disease and have recommended a lumbar epidural steroid injection to the L5-S1 level(s).  3. Reviewed pertinent imaging and records with patient  4.  Briefly discuss spinal cord stimulation if above is not helpful.  5.  Follow-up after the procedure

## 2019-05-15 NOTE — PROGRESS NOTES
This note was completed with dictation software and grammatical errors may exist.    Referring Physician: No ref. provider found    PCP: Samuel Valenzuela Jr, MD      CC:  Lower back pain    Interval history:  Patient returns to our clinic.  He underwent a lumbar MB RFA procedure performed on April 29, 2019.  He reports minimal benefit of his lower back pain. Pain is a constant aching, throbbing pain in his lower back.  Pain radiates to his bilateral hips, left greater than right.  Does have history of a lumbar laminectomy performed L4-5 perform at the [facility] [place].  Prior to surgery, he has had moderate benefit from lumbar ZEYAD performed in the past.  He denies any worsening weakness.  No bowel bladder changes.  Prior HPI:   Suraj Parrish is a 83 y.o. male presents to us with lower back pain.  Pain has been present for over 20 years on and off.  Pain is gradually worsened over past 6 months.  Presents to us with constant aching, throbbing pain in his lower back.  Pain radiates to his bilateral hips.  He denies any lower extremity radiation.  Pain worsens with standing, walking and getting up.  Pain improves with rest.  He states having history of minimally invasive spine surgery which provided moderate benefit in the past.  He also has history of lumbar ZEYAD performed by Dr. Sawyer at [facility] in 2010 and 2011 with moderate benefit.  He has not had any recent interventions.  He denies any worsening weakness.  No bowel bladder changes.    ROS:  CONSTITUTIONAL: No fevers, chills, night sweats, wt. loss, appetite changes  SKIN: no rashes or itching  ENT: No headaches, head trauma, vision changes, or eye pain  LYMPH NODES: None noticed   CV: No chest pain, palpitations.   RESP: No shortness of breath, dyspnea on exertion, cough, wheezing, or hemoptysis  GI: No nausea, emesis, diarrhea, constipation, melena, hematochezia, pain.    : No dysuria, hematuria, urgency, or frequency   HEME: No easy  bruising, bleeding problems  PSYCHIATRIC: No depression, anxiety, psychosis, hallucinations.  NEURO: No seizures, memory loss, dizziness or difficulty sleeping  MSK:  Positive HPI      Past Medical History:   Diagnosis Date    Hyperlipemia      Past Surgical History:   Procedure Laterality Date    APPENDECTOMY      Block-nerve-medial branch-lumbar L3,4,5 Bilateral 2019    [facility]    Radiofrequency Ablation, Nerve, Spinal, Lumbar, Medial Branch, L3,4,5 Bilateral 2019    [facility] at [facility]    TONSILLECTOMY, ADENOIDECTOMY       Family History   Problem Relation Age of Onset    Parkinsonism Father      Social History     Socioeconomic History    Marital status: Single     Spouse name: Not on file    Number of children: Not on file    Years of education: Not on file    Highest education level: Not on file   Occupational History    Not on file   Social Needs    Financial resource strain: Not on file    Food insecurity:     Worry: Not on file     Inability: Not on file    Transportation needs:     Medical: Not on file     Non-medical: Not on file   Tobacco Use    Smoking status: Former Smoker     Packs/day: 2.00     Years: 15.00     Pack years: 30.00     Types: Cigarettes     Last attempt to quit: 10/17/1963     Years since quittin.6    Smokeless tobacco: Never Used   Substance and Sexual Activity    Alcohol use: Yes     Alcohol/week: 4.2 oz     Types: 7 Glasses of wine per week    Drug use: No    Sexual activity: Yes     Partners: Female   Lifestyle    Physical activity:     Days per week: Not on file     Minutes per session: Not on file    Stress: Not on file   Relationships    Social connections:     Talks on phone: Not on file     Gets together: Not on file     Attends [employer] service: Not on file     Active member of club or organization: Not on file     Attends meetings of clubs or organizations: Not on file      "Relationship status: Not on file   Other Topics Concern    Not on file   Social History Narrative    Not on file         Medications/Allergies: See med card    Vitals:    05/15/19 1335   BP: 134/76   Pulse: 74   Weight: 78.9 kg (174 lb)   Height: 5' 7" (1.702 m)   PainSc: 10-Worst pain ever   PainLoc: Back         Physical exam:    GENERAL: A and O x3, the patient appears well groomed and is in no acute distress.  Skin: No rashes or obvious lesions  HEENT: normocephalic, atraumatic  CARDIOVASCULAR:  Palpable peripheral pulses  LUNGS: easy work of breathing  ABDOMEN: soft, nontender   UPPER EXTREMITIES: Normal alignment, normal range of motion, no atrophy, no skin changes,  hair growth and nail growth normal and equal bilaterally. No swelling, no tenderness.    LOWER EXTREMITIES:  Normal alignment, normal range of motion, no atrophy, no skin changes,  hair growth and nail growth normal and equal bilaterally. No swelling, no tenderness.    LUMBAR SPINE  Lumbar spine: ROM is limited with flexion extension and oblique extension with moderate increased pain.    Michael's test causes no increased pain on either side.    Supine straight leg raise is negative bilaterally.    Internal and external rotation of the hip causes no increased pain on either side.  Myofascial exam: No tenderness to palpation across lumbar paraspinous muscles.      MENTAL STATUS: normal orientation, speech, language, and fund of knowledge for social situation.  Emotional state appropriate.    CRANIAL NERVES:  II:  PERRL bilaterally,   III,IV,VI: EOMI.    V:  Facial sensation equal bilaterally  VII:  Facial motor function normal.  VIII:  Hearing equal to finger rub bilaterally  IX/X: Gag normal, palate symmetric  XI:  Shoulder shrug equal, head turn equal  XII:  Tongue midline without fasciculations      MOTOR: Tone and bulk: normal bilateral upper and lower Strength: normal "   Delt Bi Tri WE WF     R 5 5 5 5 5 5   L 5 5 5 5 5 5     IP ADD ABD Quad TA Gas HAM  R 5 5 5 5 5 5 5  L 5 5 5 5 5 5 5    SENSATION: Light touch and pinprick intact bilaterally  REFLEXES: normal, symmetric, nonbrisk.  Toes down, no clonus. No hoffmans.  GAIT: normal rise, base, steps, and arm swing.        Imaging:  Xray L-spine 2011  A intervertebral joint space narrowing is   noted at the L4-5 and the L5 S1 level similar to previous examination.    Anterior and lateral hypertrophic spurs noted at multiple levels   particularly L3 L4 and L5 and L2.    Assessment:  Patient referred for lower back pain  1. DDD (degenerative disc disease), lumbar    2. Lumbar radiculitis    3. Other spondylosis, lumbar region    4. Postlaminectomy syndrome of lumbar region          Plan:  1. I have stressed the importance of physical activity and exercise to improve overall health  2. I think that the patient's back pain and radicular leg symptoms are due to degenerative disc disease and have recommended a lumbar epidural steroid injection to the L5-S1 level(s).  3. Reviewed pertinent imaging and records with patient  4.  Briefly discuss spinal cord stimulation if above is not helpful.  5.  Follow-up after the procedure

## 2019-05-20 RX ORDER — IBUPROFEN 200 MG
200 TABLET ORAL EVERY 6 HOURS PRN
Status: ON HOLD | COMMUNITY
End: 2019-07-08

## 2019-05-21 ENCOUNTER — HOSPITAL ENCOUNTER (OUTPATIENT)
Facility: AMBULARY SURGERY CENTER | Age: 84
Discharge: HOME | End: 2019-05-21
Attending: ANESTHESIOLOGY | Admitting: ANESTHESIOLOGY
Payer: MEDICARE

## 2019-05-21 DIAGNOSIS — M54.16 LUMBAR RADICULITIS: Primary | ICD-10-CM

## 2019-05-21 PROCEDURE — 62323 PR INJ LUMBAR/SACRAL, W/IMAGING GUIDANCE: ICD-10-PCS | Mod: ,,, | Performed by: ANESTHESIOLOGY

## 2019-05-21 PROCEDURE — 62323 NJX INTERLAMINAR LMBR/SAC: CPT | Performed by: ANESTHESIOLOGY

## 2019-05-21 PROCEDURE — 62323 NJX INTERLAMINAR LMBR/SAC: CPT | Mod: ,,, | Performed by: ANESTHESIOLOGY

## 2019-05-21 RX ORDER — SODIUM CHLORIDE 9 MG/ML
INJECTION, SOLUTION INTRAMUSCULAR; INTRAVENOUS; SUBCUTANEOUS
Status: DISCONTINUED | OUTPATIENT
Start: 2019-05-21 | End: 2019-05-21 | Stop reason: HOSPADM

## 2019-05-21 RX ORDER — LIDOCAINE HYDROCHLORIDE 10 MG/ML
INJECTION, SOLUTION EPIDURAL; INFILTRATION; INTRACAUDAL; PERINEURAL
Status: DISCONTINUED | OUTPATIENT
Start: 2019-05-21 | End: 2019-05-21 | Stop reason: HOSPADM

## 2019-05-21 RX ORDER — DEXAMETHASONE SODIUM PHOSPHATE 10 MG/ML
INJECTION INTRAMUSCULAR; INTRAVENOUS
Status: DISCONTINUED
Start: 2019-05-21 | End: 2019-05-21 | Stop reason: HOSPADM

## 2019-05-21 RX ORDER — ALPRAZOLAM 1 MG/1
1 TABLET ORAL ONCE
Status: DISCONTINUED | OUTPATIENT
Start: 2019-05-21 | End: 2019-05-21 | Stop reason: HOSPADM

## 2019-05-21 RX ORDER — SODIUM CHLORIDE, SODIUM LACTATE, POTASSIUM CHLORIDE, CALCIUM CHLORIDE 600; 310; 30; 20 MG/100ML; MG/100ML; MG/100ML; MG/100ML
INJECTION, SOLUTION INTRAVENOUS ONCE AS NEEDED
Status: DISCONTINUED | OUTPATIENT
Start: 2019-05-21 | End: 2019-05-21 | Stop reason: HOSPADM

## 2019-05-21 RX ORDER — DEXAMETHASONE SODIUM PHOSPHATE 10 MG/ML
INJECTION INTRAMUSCULAR; INTRAVENOUS
Status: DISCONTINUED | OUTPATIENT
Start: 2019-05-21 | End: 2019-05-21 | Stop reason: HOSPADM

## 2019-05-21 NOTE — PLAN OF CARE
Patient is being driven home by his sister. He was able to walk to the car using his cane.Questions were answered.

## 2019-05-21 NOTE — OP NOTE
PROCEDURE DATE: 5/21/2019    Procedure:   Interlaminar epidural steroid injection at L5-S1 under fluoroscopic guidance.    Diagnosis: lUMBAR DISC DISPLACEMENT WITHOUT MYELOPATHY  pOSTOP DIAGNOSIS: sAME    Physician: Valente Montague M.D.    Medications injected:10 mg dexamethasone with 4 ml of preservative free NaCl    Local anesthetic injected:    Lidocaine 1% 2 ml total    Sedation Medications: None    Estimated blood loss:  NOne    Complications:  None    Technique:  Time-out taken to identify patient and procedure prior to starting the procedure.  With the patient laying in a prone position, the area was prepped and draped in the usual sterile fashion using ChloraPrep and a fenestrated drape.  After determining the target level with an AP fluoroscopic view, local anesthetic was given using a 25-gauge 1.5 inch needle by raising a wheal and then infiltrating toward the interlaminar entry space.  A 3.5inch 20-gauge Touhy needle was introduced under AP fluoroscopic guidance to the interlaminar space of L5-S1. Once the trajectory was established, the needle was visualized in the lateral view and advanced using loss of resistance technique. Once in the desired position, 1ml contrast was injected to confirm placement and there was no vascular uptake nor intrathecal spread.  The medication was then injected slowly. The patient tolerated the procedure well.      The patient was monitored after the procedure.   They were given post-procedure and discharge instructions to follow at home.  The patient was discharged in a stable condition.

## 2019-05-21 NOTE — DISCHARGE SUMMARY
[facility]  Discharge Note  Short Stay    Admit Date: 5/21/2019    Discharge Date and Time: 5/21/2019    Attending Physician: Valente Montague MD     Discharge Provider: Valente Montague    Diagnoses:  Active Hospital Problems    Diagnosis  POA    *Lumbar radiculitis [M54.16]  Yes      Resolved Hospital Problems   No resolved problems to display.       Hospital Course: Lumbar ZEYAD  Discharged Condition: Good    Final Diagnoses:   Active Hospital Problems    Diagnosis  POA    *Lumbar radiculitis [M54.16]  Yes      Resolved Hospital Problems   No resolved problems to display.       Disposition: Home or Self Care    Follow up/Patient Instructions:    Medications:  Reconciled Home Medications:      Medication List      CONTINUE taking these medications    aspirin 81 MG EC tablet  Commonly known as:  ECOTRIN  Take 81 mg by mouth once daily.     atorvastatin 10 MG tablet  Commonly known as:  LIPITOR  Take 10 mg by mouth once daily.     ibuprofen 200 MG tablet  Commonly known as:  ADVIL,MOTRIN  Take 200 mg by mouth every 6 (six) hours as needed for Pain.     ICAPS AREDS 14,320-226-200 unit-mg-unit Cap  Generic drug:  vitamins  A,C,E-zinc-copper  Take by mouth.     VITAMIN A ORAL  Take by mouth.          Discharge Procedure Orders   Call MD for:  temperature >100.4     Call MD for:  persistent nausea and vomiting or diarrhea     Call MD for:  severe uncontrolled pain     Call MD for:  redness, tenderness, or signs of infection (pain, swelling, redness, odor or green/yellow discharge around incision site)     Call MD for:  difficulty breathing or increased cough     Call MD for:  severe persistent headache        Follow up with MD in 2-3 weeks    Discharge Procedure Orders (must include Diet, Follow-up, Activity):   Discharge Procedure Orders (must include Diet, Follow-up, Activity)   Call MD for:  temperature >100.4     Call MD for:  persistent nausea and vomiting or diarrhea     Call MD for:  severe uncontrolled pain      Call MD for:  redness, tenderness, or signs of infection (pain, swelling, redness, odor or green/yellow discharge around incision site)     Call MD for:  difficulty breathing or increased cough     Call MD for:  severe persistent headache

## 2019-05-23 ENCOUNTER — TELEPHONE (OUTPATIENT)
Dept: PAIN MEDICINE | Facility: CLINIC | Age: 84
End: 2019-05-23

## 2019-05-23 VITALS
WEIGHT: 174 LBS | TEMPERATURE: 99 F | RESPIRATION RATE: 18 BRPM | BODY MASS INDEX: 27.31 KG/M2 | HEART RATE: 68 BPM | SYSTOLIC BLOOD PRESSURE: 138 MMHG | DIASTOLIC BLOOD PRESSURE: 74 MMHG | HEIGHT: 67 IN | OXYGEN SATURATION: 99 %

## 2019-05-23 NOTE — TELEPHONE ENCOUNTER
----- Message from Patricia Frank sent at 5/22/2019  5:22 PM CDT -----  Contact: PT  PT is calling back regarding missed call to check up on him after surgery    PT is stating that he will not be available all tomorrow morning to be reached  Call around 2pm and later     IF a call is made and pt doesn't answer please leave detail message with direct number pt can call back.   Callback: [phone]

## 2019-06-11 ENCOUNTER — OFFICE VISIT (OUTPATIENT)
Dept: PAIN MEDICINE | Facility: CLINIC | Age: 84
End: 2019-06-11
Payer: MEDICARE

## 2019-06-11 VITALS
SYSTOLIC BLOOD PRESSURE: 148 MMHG | HEART RATE: 71 BPM | WEIGHT: 173.94 LBS | BODY MASS INDEX: 27.3 KG/M2 | DIASTOLIC BLOOD PRESSURE: 84 MMHG | HEIGHT: 67 IN

## 2019-06-11 DIAGNOSIS — M96.1 POSTLAMINECTOMY SYNDROME OF LUMBAR REGION: ICD-10-CM

## 2019-06-11 DIAGNOSIS — M51.36 DDD (DEGENERATIVE DISC DISEASE), LUMBAR: ICD-10-CM

## 2019-06-11 DIAGNOSIS — M54.16 LUMBAR RADICULOPATHY: Primary | ICD-10-CM

## 2019-06-11 DIAGNOSIS — M47.896 OTHER SPONDYLOSIS, LUMBAR REGION: ICD-10-CM

## 2019-06-11 PROCEDURE — 99214 PR OFFICE/OUTPT VISIT, EST, LEVL IV, 30-39 MIN: ICD-10-PCS | Mod: S$PBB,,, | Performed by: PHYSICIAN ASSISTANT

## 2019-06-11 PROCEDURE — 99213 OFFICE O/P EST LOW 20 MIN: CPT | Mod: PBBFAC,PN | Performed by: PHYSICIAN ASSISTANT

## 2019-06-11 PROCEDURE — 99999 PR PBB SHADOW E&M-EST. PATIENT-LVL III: ICD-10-PCS | Mod: PBBFAC,,, | Performed by: PHYSICIAN ASSISTANT

## 2019-06-11 PROCEDURE — 99999 PR PBB SHADOW E&M-EST. PATIENT-LVL III: CPT | Mod: PBBFAC,,, | Performed by: PHYSICIAN ASSISTANT

## 2019-06-11 PROCEDURE — 99214 OFFICE O/P EST MOD 30 MIN: CPT | Mod: S$PBB,,, | Performed by: PHYSICIAN ASSISTANT

## 2019-06-11 NOTE — H&P (VIEW-ONLY)
Referring Physician: No ref. provider found    PCP: Samuel Valenzuela Jr, MD      CC:  Lower back pain    Interval history:  Suraj Parrish is a 83 y.o. male with chronic low back pain who presents today for f/u s/p lumbar ZEYAD at L5-S1. Reports >50% relief. He continues to have posterior thigh pain to his foot on the right. He would like to try another ZEYAD. He is also s/p lumbar MB RFA procedure performed on April 29, 2019 Low back pain has improved. =.  Does have history of a lumbar laminectomy performed L4-5 perform at the [facility] [place].  Prior to surgery, he has had moderate benefit from lumbar ZEYAD performed in the past.  He denies any worsening weakness.  No bowel bladder changes. Pain today is rated 3/10.  Pt has been seen in the clinic before, however pt is new to me.     History below per Dr. Montague    Prior HPI:   Suraj Parrish is a 83 y.o. male presents to us with lower back pain.  Pain has been present for over 20 years on and off.  Pain is gradually worsened over past 6 months.  Presents to us with constant aching, throbbing pain in his lower back.  Pain radiates to his bilateral hips.  He denies any lower extremity radiation.  Pain worsens with standing, walking and getting up.  Pain improves with rest.  He states having history of minimally invasive spine surgery which provided moderate benefit in the past.  He also has history of lumbar ZEYAD performed by Dr. Sawyer at [facility] in 2010 and 2011 with moderate benefit.  He has not had any recent interventions.  He denies any worsening weakness.  No bowel bladder changes.    Interventional History:   Lumbar ZEYAD at L5-S1 5/21/19 60-70% relief  Bilateral lumbar MB RFA at L3,4 , 5 on 4/29/19 >50% relief    ROS:  CONSTITUTIONAL: No fevers, chills, night sweats, wt. loss, appetite changes  SKIN: no rashes or itching  ENT: No headaches, head trauma, vision changes, or eye pain  LYMPH NODES: None noticed   CV: No chest pain, palpitations.    RESP: No shortness of breath, dyspnea on exertion, cough, wheezing, or hemoptysis  GI: No nausea, emesis, diarrhea, constipation, melena, hematochezia, pain.    : No dysuria, hematuria, urgency, or frequency   HEME: No easy bruising, bleeding problems  PSYCHIATRIC: No depression, anxiety, psychosis, hallucinations.  NEURO: No seizures, memory loss, dizziness or difficulty sleeping  MSK:  Positive HPI      Past Medical History:   Diagnosis Date    Hyperlipemia      Past Surgical History:   Procedure Laterality Date    APPENDECTOMY      Block-nerve-medial branch-lumbar L3,4,5 Bilateral 2019    [facility]    Injection-steroid-epidural-lumbar N/A 2019    Performed by Val[facility]ague MD at [facility] OR    Radiofrequency Ablation, Nerve, Spinal, Lumbar, Medial Branch, L3,4,5 Bilateral 2019    Performed by Valente Montague MD at [facility] OR    TONSILLECTOMY, ADENOIDECTOMY       Family History   Problem Relation Age of Onset    Parkinsonism Father      Social History     Socioeconomic History    Marital status: Single     Spouse name: Not on file    Number of children: Not on file    Years of education: Not on file    Highest education level: Not on file   Occupational History    Not on file   Social Needs    Financial resource strain: Not on file    Food insecurity:     Worry: Not on file     Inability: Not on file    Transportation needs:     Medical: Not on file     Non-medical: Not on file   Tobacco Use    Smoking status: Former Smoker     Packs/day: 2.00     Years: 15.00     Pack years: 30.00     Types: Cigarettes     Last attempt to quit: 10/17/1963     Years since quittin.6    Smokeless tobacco: Never Used   Substance and Sexual Activity    Alcohol use: Yes     Alcohol/week: 4.2 oz     Types: 7 Glasses of wine per week    Drug use: No    Sexual activity: Yes     Partners: Female   Lifestyle    Physical activity:     Days per week: Not on file     Minutes  "per session: Not on file    Stress: Not on file   Relationships    Social connections:     Talks on phone: Not on file     Gets together: Not on file     Attends Shinto service: Not on file     Active member of club or organization: Not on file     Attends meetings of clubs or organizations: Not on file     Relationship status: Not on file   Other Topics Concern    Not on file   Social History Narrative    Not on file         Medications/Allergies: See med card    Vitals:    06/11/19 0856   BP: (!) 148/84   Pulse: 71   Weight: 78.9 kg (173 lb 15.1 oz)   Height: 5' 7" (1.702 m)   PainSc:   3   PainLoc: Back         Physical exam:    GENERAL: A and O x3, the patient appears well groomed and is in no acute distress.  Skin: No rashes or obvious lesions  HEENT: normocephalic, atraumatic  CARDIOVASCULAR:  RRR  LUNGS: non labored  breathing  ABDOMEN: soft, nontender   UPPER EXTREMITIES: Normal alignment, normal range of motion, no atrophy, no skin changes,  hair growth and nail growth normal and equal bilaterally. No swelling, no tenderness.    LOWER EXTREMITIES:  Normal alignment, normal range of motion, no atrophy, no skin changes,  hair growth and nail growth normal and equal bilaterally. No swelling, no tenderness.    LUMBAR SPINE  Lumbar spine: ROM is limited with flexion extension and oblique extension with moderate increased pain.    Michael's test causes no increased pain on either side.    Supine straight leg raise is negative bilaterally.    Internal and external rotation of the hip causes no increased pain on either side.  Myofascial exam: No tenderness to palpation across lumbar paraspinous muscles.      MENTAL STATUS: normal orientation, speech, language, and fund of knowledge for social situation.  Emotional state appropriate.    CRANIAL NERVES:  II:  PERRL bilaterally,   III,IV,VI: EOMI.    V:  Facial sensation equal bilaterally  VII:  Facial motor function normal.  VIII:  Hearing equal to finger rub " bilaterally  IX/X: Gag normal, palate symmetric  XI:  Shoulder shrug equal, head turn equal  XII:  Tongue midline without fasciculations      MOTOR: Tone and bulk: normal bilateral upper and lower Strength: normal   Delt Bi Tri WE WF     R 5 5 5 5 5 5   L 5 5 5 5 5 5     IP ADD ABD Quad TA Gas HAM  R 5 5 5 5 5 5 5  L 5 5 5 5 5 5 5    SENSATION: Light touch and pinprick intact bilaterally  REFLEXES: normal, symmetric, nonbrisk.  Toes down, no clonus. No hoffmans.  GAIT: normal rise, base, steps, and arm swing.        Imaging:  Xray L-spine 2011  A intervertebral joint space narrowing is   noted at the L4-5 and the L5 S1 level similar to previous examination.    Anterior and lateral hypertrophic spurs noted at multiple levels   particularly L3 L4 and L5 and L2.    Assessment:  Suraj Parrish is a 83 y.o. male with   1. Lumbar radiculopathy    2. DDD (degenerative disc disease), lumbar    3. Other spondylosis, lumbar region    4. Postlaminectomy syndrome of lumbar region        Plan:  1. I have stressed the importance of physical activity and exercise to improve overall health  2. Scheduled lumbar TF ZEYAD at L4-5 and L5-S1 on right. I have explained the risks, benefits, and alternatives of the procedure in detail. The patient voices understanding and all questions have been answered. The patient agrees to proceed as planned. Written Consent obtained.   3. Reviewed pertinent imaging and records with patient and his wife  4. Monitor progress and consider repeat lumbar RFA in the future  5. F/u s/p TF ZEYAD

## 2019-06-11 NOTE — PROGRESS NOTES
Referring Physician: No ref. provider found    PCP: Samuel Valenzuela Jr, MD      CC:  Lower back pain    Interval history:  Suraj Parrish is a 83 y.o. male with chronic low back pain who presents today for f/u s/p lumbar ZEYAD at L5-S1. Reports >50% relief. He continues to have posterior thigh pain to his foot on the right. He would like to try another ZEYAD. He is also s/p lumbar MB RFA procedure performed on April 29, 2019 Low back pain has improved. =.  Does have history of a lumbar laminectomy performed L4-5 perform at the [facility] [place].  Prior to surgery, he has had moderate benefit from lumbar ZEYAD performed in the past.  He denies any worsening weakness.  No bowel bladder changes. Pain today is rated 3/10.  Pt has been seen in the clinic before, however pt is new to me.     History below per Dr. Montague    Prior HPI:   Suraj Parrish is a 83 y.o. male presents to us with lower back pain.  Pain has been present for over 20 years on and off.  Pain is gradually worsened over past 6 months.  Presents to us with constant aching, throbbing pain in his lower back.  Pain radiates to his bilateral hips.  He denies any lower extremity radiation.  Pain worsens with standing, walking and getting up.  Pain improves with rest.  He states having history of minimally invasive spine surgery which provided moderate benefit in the past.  He also has history of lumbar ZEYAD performed by Dr. Sawyer at [facility] in 2010 and 2011 with moderate benefit.  He has not had any recent interventions.  He denies any worsening weakness.  No bowel bladder changes.    Interventional History:   Lumbar ZEYAD at L5-S1 5/21/19 60-70% relief  Bilateral lumbar MB RFA at L3,4 , 5 on 4/29/19 >50% relief    ROS:  CONSTITUTIONAL: No fevers, chills, night sweats, wt. loss, appetite changes  SKIN: no rashes or itching  ENT: No headaches, head trauma, vision changes, or eye pain  LYMPH NODES: None noticed   CV: No chest pain, palpitations.    RESP: No shortness of breath, dyspnea on exertion, cough, wheezing, or hemoptysis  GI: No nausea, emesis, diarrhea, constipation, melena, hematochezia, pain.    : No dysuria, hematuria, urgency, or frequency   HEME: No easy bruising, bleeding problems  PSYCHIATRIC: No depression, anxiety, psychosis, hallucinations.  NEURO: No seizures, memory loss, dizziness or difficulty sleeping  MSK:  Positive HPI      Past Medical History:   Diagnosis Date    Hyperlipemia      Past Surgical History:   Procedure Laterality Date    APPENDECTOMY      Block-nerve-medial branch-lumbar L3,4,5 Bilateral 2019    [facility] at [facility]    Injection-steroid-epidural-lumbar N/A 2019    Performed by [facility] Montague [facility] at [facility]    Radiofrequency Ablation, Nerve, Spinal, Lumbar, Medial Branch, L3,4,5 Bilateral 2019    Per[facility] by Val[facility]ague [facility] at [facility] OR    TONSILLECTOMY, ADENOIDECTOMY       Family History   Problem Relation Age of Onset    Parkinsonism Father      Social History     Socioeconomic History    Marital status: Single     Spouse name: Not on file    Number of children: Not on file    Years of education: Not on file    Highest education level: Not on file   Occupational History    Not on file   Social Needs    Financial resource strain: Not on file    Food insecurity:     Worry: Not on file     Inability: Not on file    Transportation needs:     Medical: Not on file     Non-medical: Not on file   Tobacco Use    Smoking status: Former Smoker     Packs/day: 2.00     Years: 15.00     Pack years: 30.00     Types: Cigarettes     Last attempt to quit: 10/17/1963     Years since quittin.6    Smokeless tobacco: Never Used   Substance and Sexual Activity    Alcohol use: Yes     Alcohol/week: 4.2 oz     Types: 7 Glasses of wine per week    Drug use: No    Sexual activity: Yes     Partners: Female   Lifestyle    Physical activity:     Days per week: Not on file     Minutes  "per session: Not on file    Stress: Not on file   Relationships    Social connections:     Talks on phone: Not on file     Gets together: Not on file     Attends Yarsani service: Not on file     Active member of club or organization: Not on file     Attends meetings of clubs or organizations: Not on file     Relationship status: Not on file   Other Topics Concern    Not on file   Social History Narrative    Not on file         Medications/Allergies: See med card    Vitals:    06/11/19 0856   BP: (!) 148/84   Pulse: 71   Weight: 78.9 kg (173 lb 15.1 oz)   Height: 5' 7" (1.702 m)   PainSc:   3   PainLoc: Back         Physical exam:    GENERAL: A and O x3, the patient appears well groomed and is in no acute distress.  Skin: No rashes or obvious lesions  HEENT: normocephalic, atraumatic  CARDIOVASCULAR:  RRR  LUNGS: non labored  breathing  ABDOMEN: soft, nontender   UPPER EXTREMITIES: Normal alignment, normal range of motion, no atrophy, no skin changes,  hair growth and nail growth normal and equal bilaterally. No swelling, no tenderness.    LOWER EXTREMITIES:  Normal alignment, normal range of motion, no atrophy, no skin changes,  hair growth and nail growth normal and equal bilaterally. No swelling, no tenderness.    LUMBAR SPINE  Lumbar spine: ROM is limited with flexion extension and oblique extension with moderate increased pain.    Michael's test causes no increased pain on either side.    Supine straight leg raise is negative bilaterally.    Internal and external rotation of the hip causes no increased pain on either side.  Myofascial exam: No tenderness to palpation across lumbar paraspinous muscles.      MENTAL STATUS: normal orientation, speech, language, and fund of knowledge for social situation.  Emotional state appropriate.    CRANIAL NERVES:  II:  PERRL bilaterally,   III,IV,VI: EOMI.    V:  Facial sensation equal bilaterally  VII:  Facial motor function normal.  VIII:  Hearing equal to finger rub " bilaterally  IX/X: Gag normal, palate symmetric  XI:  Shoulder shrug equal, head turn equal  XII:  Tongue midline without fasciculations      MOTOR: Tone and bulk: normal bilateral upper and lower Strength: normal   Delt Bi Tri WE WF     R 5 5 5 5 5 5   L 5 5 5 5 5 5     IP ADD ABD Quad TA Gas HAM  R 5 5 5 5 5 5 5  L 5 5 5 5 5 5 5    SENSATION: Light touch and pinprick intact bilaterally  REFLEXES: normal, symmetric, nonbrisk.  Toes down, no clonus. No hoffmans.  GAIT: normal rise, base, steps, and arm swing.        Imaging:  Xray L-spine 2011  A intervertebral joint space narrowing is   noted at the L4-5 and the L5 S1 level similar to previous examination.    Anterior and lateral hypertrophic spurs noted at multiple levels   particularly L3 L4 and L5 and L2.    Assessment:  Suraj Parrish is a 83 y.o. male with   1. Lumbar radiculopathy    2. DDD (degenerative disc disease), lumbar    3. Other spondylosis, lumbar region    4. Postlaminectomy syndrome of lumbar region        Plan:  1. I have stressed the importance of physical activity and exercise to improve overall health  2. Scheduled lumbar TF ZEYAD at L4-5 and L5-S1 on right. I have explained the risks, benefits, and alternatives of the procedure in detail. The patient voices understanding and all questions have been answered. The patient agrees to proceed as planned. Written Consent obtained.   3. Reviewed pertinent imaging and records with patient and his wife  4. Monitor progress and consider repeat lumbar RFA in the future  5. F/u s/p TF ZEYAD

## 2019-06-21 DIAGNOSIS — M54.16 LUMBAR RADICULOPATHY: Primary | ICD-10-CM

## 2019-07-08 ENCOUNTER — HOSPITAL ENCOUNTER (OUTPATIENT)
Facility: AMBULARY SURGERY CENTER | Age: 84
Discharge: HOME | End: 2019-07-08
Attending: ANESTHESIOLOGY | Admitting: ANESTHESIOLOGY
Payer: MEDICARE

## 2019-07-08 DIAGNOSIS — M54.16 LUMBAR RADICULITIS: Primary | ICD-10-CM

## 2019-07-08 PROCEDURE — 64483 NJX AA&/STRD TFRM EPI L/S 1: CPT | Performed by: ANESTHESIOLOGY

## 2019-07-08 PROCEDURE — 64484 NJX AA&/STRD TFRM EPI L/S EA: CPT | Performed by: ANESTHESIOLOGY

## 2019-07-08 PROCEDURE — 64484 PRA INJECT ANES/STEROID FORAMEN LUMBAR/SACRAL W IMG GUIDE ,EA ADD LEVEL: ICD-10-PCS | Mod: LT,,, | Performed by: ANESTHESIOLOGY

## 2019-07-08 PROCEDURE — 64484 NJX AA&/STRD TFRM EPI L/S EA: CPT | Mod: LT,,, | Performed by: ANESTHESIOLOGY

## 2019-07-08 PROCEDURE — 64483 NJX AA&/STRD TFRM EPI L/S 1: CPT | Mod: LT,,, | Performed by: ANESTHESIOLOGY

## 2019-07-08 PROCEDURE — 64483 PR EPIDURAL INJ, ANES/STEROID, TRANSFORAMINAL, LUMB/SACR, SNGL LEVL: ICD-10-PCS | Mod: LT,,, | Performed by: ANESTHESIOLOGY

## 2019-07-08 RX ORDER — BUPIVACAINE HYDROCHLORIDE 2.5 MG/ML
INJECTION, SOLUTION EPIDURAL; INFILTRATION; INTRACAUDAL
Status: DISCONTINUED | OUTPATIENT
Start: 2019-07-08 | End: 2019-07-08 | Stop reason: HOSPADM

## 2019-07-08 RX ORDER — LIDOCAINE HYDROCHLORIDE 10 MG/ML
INJECTION, SOLUTION EPIDURAL; INFILTRATION; INTRACAUDAL; PERINEURAL
Status: DISCONTINUED | OUTPATIENT
Start: 2019-07-08 | End: 2019-07-08 | Stop reason: HOSPADM

## 2019-07-08 RX ORDER — DEXAMETHASONE SODIUM PHOSPHATE 10 MG/ML
INJECTION INTRAMUSCULAR; INTRAVENOUS
Status: DISCONTINUED | OUTPATIENT
Start: 2019-07-08 | End: 2019-07-08 | Stop reason: HOSPADM

## 2019-07-08 RX ORDER — SODIUM CHLORIDE, SODIUM LACTATE, POTASSIUM CHLORIDE, CALCIUM CHLORIDE 600; 310; 30; 20 MG/100ML; MG/100ML; MG/100ML; MG/100ML
INJECTION, SOLUTION INTRAVENOUS ONCE AS NEEDED
Status: DISCONTINUED | OUTPATIENT
Start: 2019-07-08 | End: 2019-07-08 | Stop reason: HOSPADM

## 2019-07-08 NOTE — PLAN OF CARE
Discharged ambulatory in stable condition to car to care of \[facility]\""iter

## 2019-07-08 NOTE — DISCHARGE INSTRUCTIONS
Before leaving, please make sure you have all your personal belongings such as glasses, purses, wallets, keys, cell phones, jewelry, jackets etc Pain injection instructions:     This procedure may take a couple weeks to relieve pain    No driving for 24 hrs.   Activity as tolerated- gradually increase activities.  Dont lift over 10 lbs for 24 hrs   No heat at injection sites x 2 days. No heating pads, hot tubs, saunas, or swimming in any body of water or pool for 2 days.  Use ice pack for mild swelling and for comfort , apply for 20 minutes, remove for 20 minute intervals. No direct contact of ice itself  to skin.  May shower today. No tub baths for two days.     Seek immediate medical help for:   Severe increase in your usual pain or appearance of new pain.  Prolonged (mor than 8 hours) or increasing weakness or numbness in the legs or arms.  .    Fever above 101 ,Drainage,redness,active bleeding, or increased swelling at the injection site.  Headache, shortness of breath, chest pain, or breathing problems.

## 2019-07-08 NOTE — DISCHARGE SUMMARY
[facility]  Discharge Note  Short Stay    Admit Date: 7/8/2019    Discharge Date and Time: 7/8/2019    Attending Physician: Valente Montague MD     Discharge Provider: Valente Montague    Diagnoses:  Active Hospital Problems    Diagnosis  POA    *Lumbar radiculitis [M54.16]  Yes      Resolved Hospital Problems   No resolved problems to display.       Hospital Course: Lumbar ZEYAD  Discharged Condition: Good    Final Diagnoses:   Active Hospital Problems    Diagnosis  POA    *Lumbar radiculitis [M54.16]  Yes      Resolved Hospital Problems   No resolved problems to display.       Disposition: Home or Self Care    Follow up/Patient Instructions:    Medications:  Reconciled Home Medications:      Medication List      CONTINUE taking these medications    atorvastatin 10 MG tablet  Commonly known as:  LIPITOR  Take 10 mg by mouth once daily.     ICAPS AREDS 14,320-226-200 unit-mg-unit Cap  Generic drug:  vitamins  A,C,E-zinc-copper  Take by mouth.     VITAMIN A ORAL  Take by mouth.          Discharge Procedure Orders   Call MD for:  temperature >100.4     Call MD for:  persistent nausea and vomiting or diarrhea     Call MD for:  severe uncontrolled pain     Call MD for:  redness, tenderness, or signs of infection (pain, swelling, redness, odor or green/yellow discharge around incision site)     Call MD for:  difficulty breathing or increased cough     Call MD for:  severe persistent headache        Follow up with MD in 2-3 weeks    Discharge Procedure Orders (must include Diet, Follow-up, Activity):   Discharge Procedure Orders (must include Diet, Follow-up, Activity)   Call MD for:  temperature >100.4     Call MD for:  persistent nausea and vomiting or diarrhea     Call MD for:  severe uncontrolled pain     Call MD for:  redness, tenderness, or signs of infection (pain, swelling, redness, odor or green/yellow discharge around incision site)     Call MD for:  difficulty breathing or increased cough     Call MD for:   severe persistent headache         Normal for race

## 2019-07-08 NOTE — OP NOTE
PROCEDURE DATE: 7/8/2019    PROCEDURE: Left L4-5 and L5-S1 transforaminal epidural steroid injection under fluoroscopy    DIAGNOSIS: Lumbar disc displacement without myelopathy  Post op diagnosis: Same    PHYSICIAN: Valente Montague MD    MEDICATIONS INJECTED:  Dexamethasone 5mg (0.5ml) and 1.5ml 0.25% bupivicaine at each nerve root.     LOCAL ANESTHETIC INJECTED:  Lidocaine 1%. 2 ml per site.    SEDATION MEDICATIONS: None    ESTIMATED BLOOD LOSS:  None    COMPLICATIONS:  None    TECHNIQUE:   A time-out was taken to identify patient and procedure side prior to starting the procedure. The patient was placed in a prone position, prepped and draped in the usual sterile fashion using ChloraPrep and sterile towels.  The area to be injected was determined under fluoroscopic guidance in AP and oblique view.  Local anesthetic was given by raising a wheal and going down to the hub of a 25-gauge 1.5 inch needle.  In oblique view, a 3.5 inch 22-gauge bent-tip spinal needle was introduced towards 6 oclock position of the pedicle of each above named nerve root level.  The needle was walked medially then hinged into the neural foramen and position was confirmed in AP and lateral views.  1ml contrast dye was injected to confirm appropriate placement and that there was no vascular uptake.  After negative aspiration for blood or CSF, the medication was then injected. This was performed at the Left L4-5 and L5-S1 level(s). The patient tolerated the procedure well.    The patient was monitored after the procedure.  Patient was given post procedure and discharge instructions to follow at home. The patient was discharged in a stable condition.

## 2019-07-11 VITALS
WEIGHT: 173.94 LBS | HEART RATE: 69 BPM | TEMPERATURE: 99 F | SYSTOLIC BLOOD PRESSURE: 163 MMHG | BODY MASS INDEX: 27.3 KG/M2 | OXYGEN SATURATION: 97 % | HEIGHT: 67 IN | RESPIRATION RATE: 18 BRPM | DIASTOLIC BLOOD PRESSURE: 81 MMHG

## 2019-08-05 NOTE — DISCHARGE SUMMARY
[facility]  Discharge Note  Short Stay    Admit Date: 4/29/2019    Discharge Date and Time: 4/29/2019    Attending Physician: Valente Montague MD     Discharge Provider: Valente Montague    Diagnoses:  Active Hospital Problems    Diagnosis  POA    *Other spondylosis, lumbar region [M47.896]  Yes      Resolved Hospital Problems   No resolved problems to display.       Hospital Course: Lumbar MB RFA  Discharged Condition: Good    Final Diagnoses:   Active Hospital Problems    Diagnosis  POA    *Other spondylosis, lumbar region [M47.896]  Yes      Resolved Hospital Problems   No resolved problems to display.       Disposition: Home or Self Care    Follow up/Patient Instructions:    Medications:  Reconciled Home Medications:      Medication List      CONTINUE taking these medications    aspirin 81 MG EC tablet  Commonly known as:  ECOTRIN  Take 81 mg by mouth once daily.     atorvastatin 10 MG tablet  Commonly known as:  LIPITOR  Take 10 mg by mouth once daily.     VITAMIN A ORAL  Take by mouth.          Discharge Procedure Orders   Call MD for:  temperature >100.4     Call MD for:  persistent nausea and vomiting or diarrhea     Call MD for:  severe uncontrolled pain     Call MD for:  redness, tenderness, or signs of infection (pain, swelling, redness, odor or green/yellow discharge around incision site)     Call MD for:  difficulty breathing or increased cough     Call MD for:  severe persistent headache        Follow up with MD in 2-3 weeks    Discharge Procedure Orders (must include Diet, Follow-up, Activity):   Discharge Procedure Orders (must include Diet, Follow-up, Activity)   Call MD for:  temperature >100.4     Call MD for:  persistent nausea and vomiting or diarrhea     Call MD for:  severe uncontrolled pain     Call MD for:  redness, tenderness, or signs of infection (pain, swelling, redness, odor or green/yellow discharge around incision site)     Call MD for:  difficulty breathing or increased cough  Pt called in regards to questions/concerns  Pt has follow-up appointment on 8/8  would like to speak with you before appointment   Please advise pt @ [phone]     Call MD for:  severe persistent headache

## 2019-08-07 ENCOUNTER — OFFICE VISIT (OUTPATIENT)
Dept: PAIN MEDICINE | Facility: CLINIC | Age: 84
End: 2019-08-07
Payer: MEDICARE

## 2019-08-07 VITALS
DIASTOLIC BLOOD PRESSURE: 75 MMHG | SYSTOLIC BLOOD PRESSURE: 128 MMHG | HEIGHT: 67 IN | HEART RATE: 78 BPM | BODY MASS INDEX: 27.15 KG/M2 | WEIGHT: 173 LBS

## 2019-08-07 DIAGNOSIS — M51.36 DDD (DEGENERATIVE DISC DISEASE), LUMBAR: ICD-10-CM

## 2019-08-07 DIAGNOSIS — M96.1 POSTLAMINECTOMY SYNDROME OF LUMBAR REGION: ICD-10-CM

## 2019-08-07 DIAGNOSIS — M47.896 OTHER SPONDYLOSIS, LUMBAR REGION: ICD-10-CM

## 2019-08-07 DIAGNOSIS — M54.16 LUMBAR RADICULITIS: Primary | ICD-10-CM

## 2019-08-07 PROCEDURE — 99213 OFFICE O/P EST LOW 20 MIN: CPT | Mod: PBBFAC,PN | Performed by: ANESTHESIOLOGY

## 2019-08-07 PROCEDURE — 99999 PR PBB SHADOW E&M-EST. PATIENT-LVL III: CPT | Mod: PBBFAC,,, | Performed by: ANESTHESIOLOGY

## 2019-08-07 PROCEDURE — 99214 OFFICE O/P EST MOD 30 MIN: CPT | Mod: S$PBB,,, | Performed by: ANESTHESIOLOGY

## 2019-08-07 PROCEDURE — 99214 PR OFFICE/OUTPT VISIT, EST, LEVL IV, 30-39 MIN: ICD-10-PCS | Mod: S$PBB,,, | Performed by: ANESTHESIOLOGY

## 2019-08-07 PROCEDURE — 99999 PR PBB SHADOW E&M-EST. PATIENT-LVL III: ICD-10-PCS | Mod: PBBFAC,,, | Performed by: ANESTHESIOLOGY

## 2019-08-07 NOTE — PROGRESS NOTES
Referring Physician: No ref. provider found    PCP: Samuel Valenzuela Jr, MD      CC:  Lower back pain    Interval history:  Suraj Parrish is a 83 y.o. male with chronic low back pain and leg pain returns our clinic he is status post left L4-5 and L5-S1 TFESI on 07/08/2019 with over 70% relief of his low back and left leg pain. Pain is much improved.  He is able to ambulate with less pain.  He is pleased with progress so far.   Does have history of a lumbar laminectomy performed L4-5 perform at the [facility].  Prior to surgery, he has had moderate benefit from lumbar ZEYAD performed in the past.  He denies any worsening weakness.  No bowel bladder changes. Pain today is rated 3/10.  Prior HPI:   Suraj Parrish is a 83 y.o. male presents to us with lower back pain.  Pain has been present for over 20 years on and off.  Pain is gradually worsened over past 6 months.  Presents to us with constant aching, throbbing pain in his lower back.  Pain radiates to his bilateral hips.  He denies any lower extremity radiation.  Pain worsens with standing, walking and getting up.  Pain improves with rest.  He states having history of minimally invasive spine surgery which provided moderate benefit in the past.  He also has history of lumbar ZEYAD performed by Dr. Sawyer at [facility] in 2010 and 2011 with moderate benefit.  He has not had any recent interventions.  He denies any worsening weakness.  No bowel bladder changes.    Interventional History:   Lumbar ZEYAD at L5-S1 5/21/19 60-70% relief  Bilateral lumbar MB RFA at L3,4 , 5 on 4/29/19 >50% relief  Left L4-5 L5-S1 TFESI 07/08/2019 with 70% relief    ROS:  CONSTITUTIONAL: No fevers, chills, night sweats, wt. loss, appetite changes  SKIN: no rashes or itching  ENT: No headaches, head trauma, vision changes, or eye pain  LYMPH NODES: None noticed   CV: No chest pain, palpitations.   RESP: No shortness of breath, dyspnea on exertion, cough, wheezing, or  hemoptysis  GI: No nausea, emesis, diarrhea, constipation, melena, hematochezia, pain.    : No dysuria, hematuria, urgency, or frequency   HEME: No easy bruising, bleeding problems  PSYCHIATRIC: No depression, anxiety, psychosis, hallucinations.  NEURO: No seizures, memory loss, dizziness or difficulty sleeping  MSK:  Positive HPI      Past Medical History:   Diagnosis Date    Hyperlipemia      Past Surgical History:   Procedure Laterality Date    APPENDECTOMY      Block-nerve-medial branch-lumbar L3,4,5 Bilateral 2019    [facility]    Injection,steroid,epidural,transforaminal approach L4-5, L5-S1 Left 2019    [facility] at [facility]    Injection-steroid-epidural-lumbar N/A 2019    [facility] at [facility]    Radiofrequency Ablation, Nerve, Spinal, Lumbar, Medial Branch, L3,4,5 Bilateral 2019    [facility] at [facility]    TONSILLECTOMY, ADENOIDECTOMY       Family History   Problem Relation Age of Onset    Parkinsonism Father      Social History     Socioeconomic History    Marital status: Single     Spouse name: Not on file    Number of children: Not on file    Years of education: Not on file    Highest education level: Not on file   Occupational History    Not on file   Social Needs    Financial resource strain: Not on file    Food insecurity:     Worry: Not on file     Inability: Not on file    Transportation needs:     Medical: Not on file     Non-medical: Not on file   Tobacco Use    Smoking status: Former Smoker     Packs/day: 2.00     Years: 15.00     Pack years: 30.00     Types: Cigarettes     Last attempt to quit: 10/17/1963     Years since quittin.8    Smokeless tobacco: Never Used   Substance and Sexual Activity    Alcohol use: Yes     Alcohol/week: 4.2 oz     Types: 7 Glasses of wine per week    Drug use: No    Sexual activity: Yes     Partners: Female   Lifestyle    Physical  "activity:     Days per week: Not on file     Minutes per session: Not on file    Stress: Not on file   Relationships    Social connections:     Talks on phone: Not on file     Gets together: Not on file     Attends Bahai service: Not on file     Active member of club or organization: Not on file     Attends meetings of clubs or organizations: Not on file     Relationship status: Not on file   Other Topics Concern    Not on file   Social History Narrative    Not on file         Medications/Allergies: See med card    Vitals:    08/07/19 1050   BP: 128/75   Pulse: 78   Weight: 78.5 kg (173 lb)   Height: 5' 7" (1.702 m)   PainSc: 0-No pain   PainLoc: Back         Physical exam:    GENERAL: A and O x3, the patient appears well groomed and is in no acute distress.  Skin: No rashes or obvious lesions  HEENT: normocephalic, atraumatic  CARDIOVASCULAR:  RRR  LUNGS: non labored  breathing  ABDOMEN: soft, nontender   UPPER EXTREMITIES: Normal alignment, normal range of motion, no atrophy, no skin changes,  hair growth and nail growth normal and equal bilaterally. No swelling, no tenderness.    LOWER EXTREMITIES:  Normal alignment, normal range of motion, no atrophy, no skin changes,  hair growth and nail growth normal and equal bilaterally. No swelling, no tenderness.    LUMBAR SPINE  Lumbar spine: ROM is limited with flexion extension and oblique extension with moderate increased pain.    Michael's test causes no increased pain on either side.    Supine straight leg raise is negative bilaterally.    Internal and external rotation of the hip causes no increased pain on either side.  Myofascial exam: No tenderness to palpation across lumbar paraspinous muscles.      MENTAL STATUS: normal orientation, speech, language, and fund of knowledge for social situation.  Emotional state appropriate.    CRANIAL NERVES:  II:  PERRL bilaterally,   III,IV,VI: EOMI.    V:  Facial sensation equal bilaterally  VII:  Facial motor " function normal.  VIII:  Hearing equal to finger rub bilaterally  IX/X: Gag normal, palate symmetric  XI:  Shoulder shrug equal, head turn equal  XII:  Tongue midline without fasciculations      MOTOR: Tone and bulk: normal bilateral upper and lower Strength: normal   Delt Bi Tri WE WF     R 5 5 5 5 5 5   L 5 5 5 5 5 5     IP ADD ABD Quad TA Gas HAM  R 5 5 5 5 5 5 5  L 5 5 5 5 5 5 5    SENSATION: Light touch and pinprick intact bilaterally  REFLEXES: normal, symmetric, nonbrisk.  Toes down, no clonus. No hoffmans.  GAIT: normal rise, base, steps, and arm swing.        Imaging:  Xray L-spine 2011  A intervertebral joint space narrowing is   noted at the L4-5 and the L5 S1 level similar to previous examination.    Anterior and lateral hypertrophic spurs noted at multiple levels   particularly L3 L4 and L5 and L2.    Assessment:  Suraj Parrish is a 83 y.o. male with   1. Lumbar radiculitis    2. DDD (degenerative disc disease), lumbar    3. Other spondylosis, lumbar region    4. Postlaminectomy syndrome of lumbar region        Plan:  1. I have stressed the importance of physical activity and exercise to improve overall health  2. Patient with significant benefit following Lumbar ZEYAD.  Patient will continue to monitor his progress.  May consider repeat procedure in future if pain returns or worsens.   3. Reviewed pertinent imaging and records with patient and his wife  4. Monitor progress and consider repeat lumbar RFA in the future  5.  Follow-up as needed

## 2020-11-06 ENCOUNTER — OFFICE VISIT (OUTPATIENT)
Dept: INTERNAL MEDICINE | Facility: CLINIC | Age: 85
End: 2020-11-06
Payer: MEDICARE

## 2020-11-06 VITALS
DIASTOLIC BLOOD PRESSURE: 82 MMHG | WEIGHT: 164.44 LBS | SYSTOLIC BLOOD PRESSURE: 134 MMHG | OXYGEN SATURATION: 98 % | HEART RATE: 76 BPM | BODY MASS INDEX: 25.81 KG/M2 | HEIGHT: 67 IN

## 2020-11-06 DIAGNOSIS — R79.9 ABNORMAL FINDING OF BLOOD CHEMISTRY, UNSPECIFIED: ICD-10-CM

## 2020-11-06 DIAGNOSIS — I25.118 CORONARY ARTERY DISEASE OF NATIVE ARTERY OF NATIVE HEART WITH STABLE ANGINA PECTORIS: ICD-10-CM

## 2020-11-06 DIAGNOSIS — R06.09 DOE (DYSPNEA ON EXERTION): ICD-10-CM

## 2020-11-06 DIAGNOSIS — M51.36 DEGENERATION OF INTERVERTEBRAL DISC OF LUMBAR REGION: ICD-10-CM

## 2020-11-06 DIAGNOSIS — I70.0 ARTERIOSCLEROSIS OF AORTA: ICD-10-CM

## 2020-11-06 DIAGNOSIS — Z00.00 ANNUAL PHYSICAL EXAM: Primary | ICD-10-CM

## 2020-11-06 DIAGNOSIS — M94.9 DISORDER OF CARTILAGE, UNSPECIFIED: ICD-10-CM

## 2020-11-06 DIAGNOSIS — E78.2 MIXED HYPERLIPIDEMIA: ICD-10-CM

## 2020-11-06 PROBLEM — H91.90 HEARING LOSS: Status: ACTIVE | Noted: 2020-11-06

## 2020-11-06 PROBLEM — E78.00 PURE HYPERCHOLESTEROLEMIA: Status: ACTIVE | Noted: 2020-11-06

## 2020-11-06 PROBLEM — G25.81 RESTLESS LEGS SYNDROME: Status: RESOLVED | Noted: 2020-11-06 | Resolved: 2020-11-06

## 2020-11-06 PROBLEM — M54.16 LUMBAR RADICULITIS: Status: RESOLVED | Noted: 2019-05-21 | Resolved: 2020-11-06

## 2020-11-06 PROBLEM — M47.896 OTHER SPONDYLOSIS, LUMBAR REGION: Status: RESOLVED | Noted: 2019-04-11 | Resolved: 2020-11-06

## 2020-11-06 PROBLEM — H91.90 HEARING LOSS: Status: RESOLVED | Noted: 2020-11-06 | Resolved: 2020-11-06

## 2020-11-06 PROBLEM — G25.81 RESTLESS LEGS SYNDROME: Status: ACTIVE | Noted: 2020-11-06

## 2020-11-06 PROCEDURE — 99204 PR OFFICE/OUTPT VISIT, NEW, LEVL IV, 45-59 MIN: ICD-10-PCS | Mod: S$PBB,,, | Performed by: INTERNAL MEDICINE

## 2020-11-06 PROCEDURE — 99204 OFFICE O/P NEW MOD 45 MIN: CPT | Mod: S$PBB,,, | Performed by: INTERNAL MEDICINE

## 2020-11-06 PROCEDURE — 99214 OFFICE O/P EST MOD 30 MIN: CPT | Mod: PBBFAC | Performed by: INTERNAL MEDICINE

## 2020-11-06 PROCEDURE — 99999 PR PBB SHADOW E&M-EST. PATIENT-LVL IV: ICD-10-PCS | Mod: PBBFAC,,, | Performed by: INTERNAL MEDICINE

## 2020-11-06 PROCEDURE — 99999 PR PBB SHADOW E&M-EST. PATIENT-LVL IV: CPT | Mod: PBBFAC,,, | Performed by: INTERNAL MEDICINE

## 2020-11-06 RX ORDER — ASPIRIN 81 MG/1
81 TABLET ORAL
COMMUNITY
Start: 2020-10-06

## 2020-11-06 RX ORDER — ATORVASTATIN CALCIUM 20 MG/1
20 TABLET, FILM COATED ORAL DAILY
Qty: 90 TABLET | Refills: 3 | Status: SHIPPED | OUTPATIENT
Start: 2020-11-06 | End: 2020-11-06 | Stop reason: SDUPTHER

## 2020-11-06 RX ORDER — ATORVASTATIN CALCIUM 10 MG/1
10 TABLET, FILM COATED ORAL DAILY
Qty: 90 TABLET | Refills: 3 | Status: SHIPPED | OUTPATIENT
Start: 2020-11-06 | End: 2020-11-06 | Stop reason: SDUPTHER

## 2020-11-06 RX ORDER — ATORVASTATIN CALCIUM 20 MG/1
20 TABLET, FILM COATED ORAL DAILY
Qty: 90 TABLET | Refills: 3 | Status: SHIPPED | OUTPATIENT
Start: 2020-11-06

## 2020-11-06 RX ORDER — NITROGLYCERIN 0.4 MG/1
0.4 TABLET SUBLINGUAL EVERY 5 MIN PRN
Qty: 10 TABLET | Refills: 12 | Status: SHIPPED | OUTPATIENT
Start: 2020-11-06 | End: 2020-12-06

## 2020-11-06 RX ORDER — LANOLIN ALCOHOL/MO/W.PET/CERES
1000 CREAM (GRAM) TOPICAL
COMMUNITY
Start: 2020-10-06

## 2020-11-06 NOTE — PROGRESS NOTES
INTERNAL MEDICINE CLINIC    Initial Visit to Establish Care    PRESENTING HISTORY     Previous PCP:  10 years ago.    Chief Complaint/Reason for Visit:   Establish Care.    History of Present Illness & ROS : Mr. Suraj Parrish is a 85 y.o. male.      Had normal stress test and echo in [place].  Will have angiogram next week.    Review of Systems:  Review of Systems   Constitutional: Negative for chills and fever.   Eyes: Negative for blurred vision and double vision (saw Eye doctor 1 month ago).   Respiratory: Negative for cough and shortness of breath.    Cardiovascular: Positive for chest pain (burning sensation in chest after one block).   Gastrointestinal: Negative for abdominal pain, heartburn and nausea.   Musculoskeletal: Positive for back pain.   Skin: Negative for rash.       PAST HISTORY:     Past Medical History:   Diagnosis Date    Arteriosclerosis of aorta 11/6/2020    Degeneration of intervertebral disc of lumbar region 11/6/2020    MCCULLOUGH (dyspnea on exertion) 11/6/2020    Hearing loss 11/06/2020    Hearing aid in both ears    Lumbar radiculitis 5/21/2019    Mixed hyperlipidemia 11/6/2020    Other spondylosis, lumbar region 4/11/2019    Restless legs syndrome 11/6/2020       Past Surgical History:   Procedure Laterality Date    APPENDECTOMY  1958    cataract Bilateral     EPIDURAL STEROID INJECTION INTO LUMBAR SPINE N/A 5/21/2019    Procedure: Injection-steroid-epidural-lumbar;  Surgeon: Valente Montague MD;  Location: [facility] OR;  Service: Pain Management;  Laterality: N/A;  L5-S1    INJECTION OF ANESTHETIC AGENT AROUND MEDIAL BRANCH NERVES INNERVATING LUMBAR FACET JOINT Bilateral 4/11/2019    Procedure: Block-nerve-medial branch-lumbar L3,4,5;  Surgeon: Valente Montague MD;  Location: [facility] OR;  Service: Pain Management;  Laterality: Bilateral;    RADIOFREQUENCY ABLATION OF LUMBAR MEDIAL BRANCH NERVE AT SINGLE LEVEL Bilateral 4/29/2019    Procedure: Radiofrequency Ablation, Nerve, Spinal, Lumbar,  Medial Branch, L3,4,5;  Surgeon: Valente Montague MD;  Location: [facility] OR;  Service: Pain Management;  Laterality: Bilateral;  faustino multigen pain management generator  SN [id number]  80 degrees for 90 seconds     TONSILLECTOMY, ADENOIDECTOMY      TRANSFORAMINAL EPIDURAL INJECTION OF STEROID Left 2019    Procedure: Injection,steroid,epidural,transforaminal approach L4-5, L5-S1;  Surgeon: Valente Montague MD;  Location: [facility] OR;  Service: Pain Management;  Laterality: Left;       Family History   Problem Relation Age of Onset    Parkinsonism Father        Social History     Socioeconomic History    Marital status: Single     Spouse name: Not on file    Number of children: 0    Years of education: Not on file    Highest education level: Not on file   Occupational History    Not on file   Social Needs    Financial resource strain: Not on file    Food insecurity     Worry: Not on file     Inability: Not on file    Transportation needs     Medical: Not on file     Non-medical: Not on file   Tobacco Use    Smoking status: Former Smoker     Packs/day: 2.00     Years: 15.00     Pack years: 30.00     Types: Cigarettes     Quit date: 10/17/1963     Years since quittin.0    Smokeless tobacco: Never Used   Substance and Sexual Activity    Alcohol use: Yes     Alcohol/week: 7.0 standard drinks     Types: 7 Glasses of wine per week     Frequency: 2-3 times a week     Comment: occasional now    Drug use: No    Sexual activity: Yes     Partners: Female   Lifestyle    Physical activity     Days per week: Not on file     Minutes per session: Not on file    Stress: Not on file   Relationships    Social connections     Talks on phone: Not on file     Gets together: Not on file     Attends Buddhist service: Not on file     Active member of club or organization: Not on file     Attends meetings of clubs or organizations: Not on file     Relationship status: Not on file   Other Topics Concern    Not on file    Social History Narrative    Was in electronic before prison    Single.    Lives in [place].    No kids.       MEDICATIONS & ALLERGIES:     Current Outpatient Medications on File Prior to Visit   Medication Sig Dispense Refill    aspirin (ECOTRIN) 81 MG EC tablet 81 mg.      cyanocobalamin (VITAMIN B-12) 1000 MCG tablet 1,000 mcg.      vitamins  A,C,E-zinc-copper (ICAPS AREDS) 14,320-226-200 unit-mg-unit Cap Take by mouth.      atorvastatin (LIPITOR) 10 MG tablet Take 10 mg by mouth once daily.         VITAMIN A ORAL Take by mouth.       No current facility-administered medications on file prior to visit.         Review of patient's allergies indicates:  No Known Allergies    Medications Reconciliation:   I have reconciled the patient's home medications with the patient/family. I have updated all changes.  Refer to After-Visit Medication List.    OBJECTIVE:     Vital Signs:  Vitals:    11/06/20 1538   BP: 134/82   Pulse: 76     Wt Readings from Last 1 Encounters:   11/06/20 1538 74.6 kg (164 lb 7.4 oz)     Body mass index is 25.76 kg/m².     Physical Exam:  General: Well developed, well nourished. No distress.  HEENT: Head is normocephalic, atraumatic   Eyes: Clear conjunctiva.  Neck: Supple, symmetrical neck; trachea midline.  Lungs: Clear to auscultation bilaterally and normal respiratory effort.  Cardiovascular: Heart with regular rate and rhythm.    Extremities: No LE edema.    Abdomen: Abdomen is soft, non-tender non-distended with normal bowel sounds.  Genital:  Normal penis.   No rash in the genital area.  Scrotum and epididymis normal. No inguinal hernia.  No inguinal nodes.   Rectal: No perianal lesions or rash. Digital exam: deferred.   Lymph Nodes: No cervical, supraclavicular or axillary adenopathy.  Psychiatric: Normal affect. Alert.    Laboratory  Lab Results   Component Value Date    WBC 11.26 (H) 11/19/2012    HGB 13.0 (L) 11/19/2012    HCT 39.6 (L) 11/19/2012     11/19/2012    ALT  16 11/19/2012    AST 19 11/19/2012     (L) 11/19/2012    K 4.1 11/19/2012    CL 96 11/19/2012    CREATININE 0.9 11/19/2012    BUN 15 11/19/2012    CO2 26 11/19/2012       ASSESSMENT & PLAN:   New patient who has not seen Primary Care Provider at [facility] for the past 3 years.  Patient is new to me. Medical, surgical, social, medication and allergy histories were obtained during this visit.    Annual physical exam  - Reviewed and updated past and current medical problems.  Discussed treatment of current medical problems    Coronary artery disease of native artery of native heart with stable angina pectoris  MCCULLOUGH (dyspnea on exertion)  CT :  Incidental findings: Moderate coronary artery disease, mild atherosclerosis of the aorta, normal sized mediastinal lymph nodes, liver calcification, degenerative change of the spine.     Recent work up at [place]. Was planning for Angiogram  Would prefer workup here.  On ASA and statin.  BP normal. No smoking.  ______________________________________     -     Ambulatory referral/consult to Cardiology; Future; Expected date: 11/13/2020    -     nitroGLYCERIN (NITROSTAT) 0.4 MG SL tablet; Place 1 tablet (0.4 mg total) under the tongue every 5 (five) minutes as needed for Chest pain.  Dispense: 10 tablet; Refill: 12  -     atorvastatin (LIPITOR) 20 MG tablet; Take 1 tablet (20 mg total) by mouth once daily.  Dispense: 90 tablet; Refill: 3    Arteriosclerosis of aorta  Mixed hyperlipidemia  - On statin and aspirin.    Degeneration of intervertebral disc of lumbar region  - Chronic back pain. No need for medciation.      Preventive Health Maintenance:  Up to date    Return to Clinic for Follow Up with me:   1 Year.    Scheduled Follow-up :  Future Appointments   Date Time Provider Department Center   11/6/2020  4:30 PM LAB, [facility]   11/11/2020  9:00 AM Johnnie Acosta MD [facility] CARDIO [facility]       After Visit Medication List :      Medication List          Accurate as of November 6, 2020  4:19 PM. If you have any questions, ask your nurse or doctor.            START taking these medications    nitroGLYCERIN 0.4 MG SL tablet  Commonly known as: NITROSTAT  Place 1 tablet (0.4 mg total) under the tongue every 5 (five) minutes as needed for Chest pain.  Started by: Samuel Klein MD        CHANGE how you take these medications    atorvastatin 20 MG tablet  Commonly known as: LIPITOR  Take 1 tablet (20 mg total) by mouth once daily.  What changed:   · medication strength  · how much to take  Changed by: Samuel Klein MD        CONTINUE taking these medications    aspirin 81 MG EC tablet  Commonly known as: ECOTRIN     cyanocobalamin 1000 MCG tablet  Commonly known as: VITAMIN B-12     ICAPS AREDS 14,933-226-200 unit-mg-unit Cap  Generic drug: vitamins  A,C,E-zinc-copper        STOP taking these medications    VITAMIN A ORAL  Stopped by: Samuel Klein MD           Where to Get Your Medications      These medications were sent to [id number] - [place], [place] - [address]  [address], [place] [address]    Phone: [phone]   · atorvastatin 20 MG tablet  · nitroGLYCERIN 0.4 MG SL tablet         Signing Physician:  Samuel Klein MD

## 2020-11-07 PROBLEM — R73.03 PREDIABETES: Status: ACTIVE | Noted: 2020-11-07

## 2020-11-11 ENCOUNTER — DOCUMENTATION ONLY (OUTPATIENT)
Dept: CARDIOLOGY | Facility: CLINIC | Age: 85
End: 2020-11-11

## 2020-11-11 ENCOUNTER — OFFICE VISIT (OUTPATIENT)
Dept: CARDIOLOGY | Facility: CLINIC | Age: 85
End: 2020-11-11
Payer: MEDICARE

## 2020-11-11 ENCOUNTER — LAB VISIT (OUTPATIENT)
Dept: SURGERY | Facility: CLINIC | Age: 85
End: 2020-11-11
Payer: MEDICARE

## 2020-11-11 VITALS
HEIGHT: 67 IN | WEIGHT: 164.88 LBS | SYSTOLIC BLOOD PRESSURE: 139 MMHG | BODY MASS INDEX: 25.88 KG/M2 | DIASTOLIC BLOOD PRESSURE: 74 MMHG | OXYGEN SATURATION: 99 % | HEART RATE: 67 BPM

## 2020-11-11 VITALS
OXYGEN SATURATION: 98 % | HEART RATE: 65 BPM | WEIGHT: 166.69 LBS | HEIGHT: 68 IN | SYSTOLIC BLOOD PRESSURE: 129 MMHG | BODY MASS INDEX: 25.26 KG/M2 | DIASTOLIC BLOOD PRESSURE: 66 MMHG

## 2020-11-11 DIAGNOSIS — Z01.818 PRE-OP TESTING: ICD-10-CM

## 2020-11-11 DIAGNOSIS — I25.10 CORONARY ARTERY CALCIFICATION SEEN ON COMPUTED TOMOGRAPHY: ICD-10-CM

## 2020-11-11 DIAGNOSIS — E78.2 MIXED HYPERLIPIDEMIA: ICD-10-CM

## 2020-11-11 DIAGNOSIS — I70.0 ARTERIOSCLEROSIS OF AORTA: ICD-10-CM

## 2020-11-11 DIAGNOSIS — I25.10 CAD (CORONARY ARTERY DISEASE): ICD-10-CM

## 2020-11-11 DIAGNOSIS — I25.10 CORONARY ARTERY DISEASE, ANGINA PRESENCE UNSPECIFIED, UNSPECIFIED VESSEL OR LESION TYPE, UNSPECIFIED WHETHER NATIVE OR TRANSPLANTED HEART: ICD-10-CM

## 2020-11-11 DIAGNOSIS — I20.0 UNSTABLE ANGINA: ICD-10-CM

## 2020-11-11 DIAGNOSIS — R07.9 CHEST PAIN ON EXERTION: Primary | ICD-10-CM

## 2020-11-11 DIAGNOSIS — Z01.818 PRE-OP TESTING: Primary | ICD-10-CM

## 2020-11-11 DIAGNOSIS — I25.10 CORONARY ARTERY DISEASE, ANGINA PRESENCE UNSPECIFIED, UNSPECIFIED VESSEL OR LESION TYPE, UNSPECIFIED WHETHER NATIVE OR TRANSPLANTED HEART: Primary | ICD-10-CM

## 2020-11-11 DIAGNOSIS — I25.118 CORONARY ARTERY DISEASE OF NATIVE ARTERY OF NATIVE HEART WITH STABLE ANGINA PECTORIS: Primary | ICD-10-CM

## 2020-11-11 DIAGNOSIS — R73.03 PREDIABETES: ICD-10-CM

## 2020-11-11 DIAGNOSIS — R06.09 DOE (DYSPNEA ON EXERTION): ICD-10-CM

## 2020-11-11 PROCEDURE — U0003 INFECTIOUS AGENT DETECTION BY NUCLEIC ACID (DNA OR RNA); SEVERE ACUTE RESPIRATORY SYNDROME CORONAVIRUS 2 (SARS-COV-2) (CORONAVIRUS DISEASE [COVID-19]), AMPLIFIED PROBE TECHNIQUE, MAKING USE OF HIGH THROUGHPUT TECHNOLOGIES AS DESCRIBED BY CMS-2020-01-R: HCPCS

## 2020-11-11 PROCEDURE — 99204 PR OFFICE/OUTPT VISIT, NEW, LEVL IV, 45-59 MIN: ICD-10-PCS | Mod: S$PBB,,, | Performed by: INTERNAL MEDICINE

## 2020-11-11 PROCEDURE — 99999 PR PBB SHADOW E&M-EST. PATIENT-LVL III: ICD-10-PCS | Mod: PBBFAC,,, | Performed by: INTERNAL MEDICINE

## 2020-11-11 PROCEDURE — 99213 OFFICE O/P EST LOW 20 MIN: CPT | Mod: PBBFAC,27,25 | Performed by: INTERNAL MEDICINE

## 2020-11-11 PROCEDURE — 99999 PR PBB SHADOW E&M-EST. PATIENT-LVL III: CPT | Mod: PBBFAC,,, | Performed by: INTERNAL MEDICINE

## 2020-11-11 PROCEDURE — 99204 OFFICE O/P NEW MOD 45 MIN: CPT | Mod: S$PBB,,, | Performed by: INTERNAL MEDICINE

## 2020-11-11 PROCEDURE — 99999 PR PBB SHADOW E&M-EST. PATIENT-LVL IV: CPT | Mod: PBBFAC,,, | Performed by: INTERNAL MEDICINE

## 2020-11-11 PROCEDURE — 99205 PR OFFICE/OUTPT VISIT, NEW, LEVL V, 60-74 MIN: ICD-10-PCS | Mod: S$PBB,GC,, | Performed by: INTERNAL MEDICINE

## 2020-11-11 PROCEDURE — 93010 EKG 12-LEAD: ICD-10-PCS | Mod: S$PBB,,, | Performed by: INTERNAL MEDICINE

## 2020-11-11 PROCEDURE — 99214 OFFICE O/P EST MOD 30 MIN: CPT | Mod: PBBFAC | Performed by: INTERNAL MEDICINE

## 2020-11-11 PROCEDURE — 93010 ELECTROCARDIOGRAM REPORT: CPT | Mod: S$PBB,,, | Performed by: INTERNAL MEDICINE

## 2020-11-11 PROCEDURE — 93005 ELECTROCARDIOGRAM TRACING: CPT | Mod: PBBFAC | Performed by: INTERNAL MEDICINE

## 2020-11-11 PROCEDURE — 99205 OFFICE O/P NEW HI 60 MIN: CPT | Mod: S$PBB,GC,, | Performed by: INTERNAL MEDICINE

## 2020-11-11 PROCEDURE — 99999 PR PBB SHADOW E&M-EST. PATIENT-LVL IV: ICD-10-PCS | Mod: PBBFAC,,, | Performed by: INTERNAL MEDICINE

## 2020-11-11 RX ORDER — METOPROLOL SUCCINATE 25 MG/1
12.5 TABLET, EXTENDED RELEASE ORAL 2 TIMES DAILY
Status: ON HOLD | COMMUNITY
End: 2020-11-12 | Stop reason: HOSPADM

## 2020-11-11 RX ORDER — SODIUM CHLORIDE 9 MG/ML
3 INJECTION, SOLUTION INTRAVENOUS CONTINUOUS
Status: CANCELLED | OUTPATIENT
Start: 2020-11-11 | End: 2020-11-11

## 2020-11-11 RX ORDER — ISOSORBIDE DINITRATE 30 MG/1
15 TABLET ORAL DAILY
Status: ON HOLD | COMMUNITY
End: 2020-11-12 | Stop reason: HOSPADM

## 2020-11-11 RX ORDER — DIPHENHYDRAMINE HCL 25 MG
50 CAPSULE ORAL ONCE
Status: CANCELLED | OUTPATIENT
Start: 2020-11-11 | End: 2020-11-11

## 2020-11-11 NOTE — LETTER
November 12, 2020      Johnnie Acosta MD  [address]  [facility] [address]           [facility] [address]  [address]  [facility] [address]  Phone: [phone]          Patient: Suraj Parrish   MR Number: [id number]   YOB: 1935   Date of Visit: 11/11/2020       Dear Dr. Johnnie Acosta:    Thank you for referring Suraj Parrish to me for evaluation. Attached you will find relevant portions of my assessment and plan of care.    If you have questions, please do not hesitate to call me. I look forward to following Suraj Parrish along with you.    Sincerely,    Panfilo Dawson MD    Enclosure  CC:  No Recipients    If you would like to receive this communication electronically, please contact [email or web address] or [phone] to request more information on [employer] Link access.    For providers and/or their staff who would like to refer a patient to [facility], please contact us through our one-stop-shop provider referral line, [facility], at [phone].    If you feel you have received this communication in error or would no longer like to receive these types of communications, please e-mail [email or web address]

## 2020-11-11 NOTE — LETTER
November 11, 2020      Samuel Klein MD  [address]  [facility] [address]           [facility] 3rd Floor  [address]  [facility] [address]  Phone: [phone]          Patient: Suraj Parrish   MR Number: [id number]   YOB: 1935   Date of Visit: 11/11/2020       Dear Dr. Samuel Klein:    Thank you for referring Suraj Parrish to me for evaluation. Attached you will find relevant portions of my assessment and plan of care.    If you have questions, please do not hesitate to call me. I look forward to following Suraj Parrish along with you.    Sincerely,    Johnnie Acosta MD    Enclosure  CC:  No Recipients    If you would like to receive this communication electronically, please contact [email or web address] or [phone] to request more information on [employer] Link access.    For providers and/or their staff who would like to refer a patient to [facility], please contact us through our one-stop-shop provider referral line, [facility] [place], at [phone].    If you feel you have received this communication in error or would no longer like to receive these types of communications, please e-mail [email or web address]

## 2020-11-11 NOTE — PROGRESS NOTES
OUTPATIENT CATHETERIZATION INSTRUCTIONS    You have been scheduled for a procedure in the catheterization lab on Thursday, November 12, 2020.     Please report to the Cardiology Waiting Area on the Third floor of the hospital and check in at 9 AM.   You will then be taken to the SSCU (Short Stay Cardiac Unit) and prepared for your procedure. Please be aware that this is not the time of your procedure but the time you are to arrive. The procedures are scheduled on an hourly basis; however, emergency cases take precedence over all other cases.       You may not have anything to eat or drink after midnight the night before your test. You may take your regular morning medications with water. If there are any medications that you should not take you will be instructed to hold them that morning. If you are diabetic and on Metformin (Glucophage) do not take it the day before, the day of, and for 2 days after your procedure.      The procedure will take 1-2 hours to perform. After the procedure, you will return to SSCU on the third floor of the hospital. You will need to lie still (or keep your arm still) for the next 4 to 6 hours to minimize bleeding from the puncture site. Your family may remain in the room with you during this time.       You may be able to be discharged home that same afternoon if there is someone to drive you home and there were no complications. If you have one of the balloon, stent, or device procedures you may spend the night in the hospital. Your doctor will determine, based on your progress, the date and time of your discharge. The results of your procedure will be discussed with you before you are discharged. Any further testing or procedures will be scheduled for you either before you leave or you will be called with these appointments.       If you should have any questions, concerns, or need to change the date of your procedure, please call  LILLI Hardy @ [phone]    Special  Instructions:  Drink plenty of water the day before and after your procedure.     THE ABOVE INSTRUCTIONS WERE GIVEN TO THE PATIENT VERBALLY AND THEY VERBALIZED UNDERSTANDING.  THEY DO NOT REQUIRE ANY SPECIAL NEEDS AND DO NOT HAVE ANY LEARNING BARRIERS.          Directions for Reporting to Cardiology Waiting Area in the Hospital  If you park in the Parking Garage:  Take elevators to the1st floor of the parking garage.  Continue past the gift shop, coffee shop, and piano.  Take a right and go to the gold elevators. (Elevator B)  Take the elevator to the 3rd floor.  Follow the arrow on the sign on the wall that says Cath Lab Registration/EP Lab Registration.  Follow the long hallway all the way around until you come to a big open area.  This is the registration area.  Check in at Reception Desk.    OR    If family is dropping you off:  Have them drop you off at the front of the Hospital under the green overhang.  Enter through the doors and take a right.  Take the E elevators to the 3rd floor Cardiology Waiting Area.  Check in at the Reception Desk in the waiting room.

## 2020-11-11 NOTE — PROGRESS NOTES
Interventional Cardiology Initial office visit     Patient: Suraj Parrish   MRN: [id number]  PCP: Samuel Klein MD   Today's date: 11/11/2020     Subjective:     Mr. Suraj Parrish, 85 y.o. male with prior history of Prediabetes, Arteriosclerosis of aorta, coronary calcification in CT. He was referred to interventional cardiology clinic for evaluating of chest pain that has been ongoing for one month. His pain is described as retrosternal chest pain started couple of months ago. It is exertional in nature and more centrally. The pain is worse when he ambulate for more than 500 feet. The pain is better when he is taking a rest. He denies dyspnea on exertion, palpitation or loss of consciousness. He underwent a treadmill test but only able to walk < 3 minutes before aborting the test for .symptomatic chest pain. The patient had a chest CT done 8 years ago that did demonstrate coronary artery calcification.  He has dyslipidemia on moderate intensity statin therapy.  His most recent LDL was 65 with low HDL.  Triglycerides are mildly elevated.    History:     Past Medical History:   Diagnosis Date    Arteriosclerosis of aorta 11/6/2020    Degeneration of intervertebral disc of lumbar region 11/6/2020    MCCULLOUGH (dyspnea on exertion) 11/6/2020    Hearing loss 11/06/2020    Hearing aid in both ears    Lumbar radiculitis 5/21/2019    Mixed hyperlipidemia 11/6/2020    Other spondylosis, lumbar region 4/11/2019    Prediabetes 11/7/2020    Restless legs syndrome 11/6/2020     Past Surgical History:   Procedure Laterality Date    APPENDECTOMY  1958    cataract Bilateral     EPIDURAL STEROID INJECTION INTO LUMBAR SPINE N/A 5/21/2019    Procedure: Injection-steroid-epidural-lumbar;  Surgeon: Valente Montague MD;  Location: [facility];  Service: Pain Management;  Laterality: N/A;  L5-S1    INJECTION OF ANESTHETIC AGENT AROUND MEDIAL BRANCH NERVES INNERVATING LUMBAR FACET JOINT Bilateral 4/11/2019    Procedure:  Block-nerve-medial branch-lumbar L3,4,5;  Surgeon: Valente Montague MD;  Location: [facility] OR;  Service: Pain Management;  Laterality: Bilateral;    RADIOFREQUENCY ABLATION OF LUMBAR MEDIAL BRANCH NERVE AT SINGLE LEVEL Bilateral 2019    Procedure: Radiofrequency Ablation, Nerve, Spinal, Lumbar, Medial Branch, L3,4,5;  Surgeon: Valente Montague MD;  Location: [facility] OR;  Service: Pain Management;  Laterality: Bilateral;  faustino multigen pain management generator  SN [id number]  80 degrees for 90 seconds     TONSILLECTOMY, ADENOIDECTOMY  194    TRANSFORAMINAL EPIDURAL INJECTION OF STEROID Left 2019    Procedure: Injection,steroid,epidural,transforaminal approach L4-5, L5-S1;  Surgeon: Valente Montague MD;  Location: [facility] OR;  Service: Pain Management;  Laterality: Left;       Social History     Tobacco Use    Smoking status: Former Smoker     Packs/day: 2.00     Years: 15.00     Pack years: 30.00     Types: Cigarettes     Quit date: 10/17/1963     Years since quittin.1    Smokeless tobacco: Never Used   Substance Use Topics    Alcohol use: Not Currently     Alcohol/week: 7.0 standard drinks     Types: 7 Glasses of wine per week     Frequency: 2-3 times a week     Comment: occasional now     Family History   Problem Relation Age of Onset    Parkinsonism Father     Heart disease Father        Meds:   Review of patient's allergies indicates:  No Known Allergies    Current Outpatient Medications:     aspirin (ECOTRIN) 81 MG EC tablet, 81 mg., Disp: , Rfl:     atorvastatin (LIPITOR) 20 MG tablet, Take 1 tablet (20 mg total) by mouth once daily., Disp: 90 tablet, Rfl: 3    cyanocobalamin (VITAMIN B-12) 1000 MCG tablet, 1,000 mcg., Disp: , Rfl:     isosorbide dinitrate (ISORDIL) 30 MG Tab, Take 15 mg by mouth once daily. Pt taken 1/2 tablet daily, Disp: , Rfl:     metoprolol succinate (TOPROL-XL) 25 MG 24 hr tablet, Take 12.5 mg by mouth 2 (two) times daily., Disp: , Rfl:     vitamins  A,C,E-zinc-copper (ICAPS  "AREDS) 14,229-226-200 unit-mg-unit Cap, Take by mouth., Disp: , Rfl:     nitroGLYCERIN (NITROSTAT) 0.4 MG SL tablet, Place 1 tablet (0.4 mg total) under the tongue every 5 (five) minutes as needed for Chest pain. (Patient not taking: Reported on 11/11/2020), Disp: 10 tablet, Rfl: 12      Review of Systems   Constitutional: Negative for fever and malaise/fatigue.   HENT: Negative for ear discharge and hearing loss.    Eyes: Negative for blurred vision and double vision.   Respiratory: Positive for shortness of breath (with chest pain). Negative for cough, hemoptysis and sputum production.    Cardiovascular: Positive for chest pain. Negative for palpitations, claudication, leg swelling and PND.   Gastrointestinal: Negative for abdominal pain and constipation.   Genitourinary: Negative for dysuria and urgency.       Objective:   /74 (BP Location: Right arm, Patient Position: Sitting, BP Method: Large (Automatic))   Pulse 67   Ht 5' 7.2" (1.707 m)   Wt 74.8 kg (164 lb 14.5 oz)   SpO2 99%   BMI 25.67 kg/m²   Physical Exam   Constitutional: He is oriented to person, place, and time and well-developed, well-nourished, and in no distress. No distress.   HENT:   Head: Normocephalic and atraumatic.   Right Ear: External ear normal.   Left Ear: External ear normal.   Eyes: Pupils are equal, round, and reactive to light. Right eye exhibits no discharge. Left eye exhibits no discharge.   Neck: Normal range of motion. Neck supple. No thyromegaly present.   Cardiovascular: Normal rate and regular rhythm. Exam reveals no friction rub.   No murmur heard.  Pulses:       Radial pulses are 2+ on the right side and 2+ on the left side.        Dorsalis pedis pulses are 1+ on the right side and 1+ on the left side.        Posterior tibial pulses are 1+ on the right side and 1+ on the left side.   Pulmonary/Chest: Effort normal and breath sounds normal. No respiratory distress. He has no wheezes.   Musculoskeletal: Normal range " of motion.         General: No deformity or edema.   Neurological: He is alert and oriented to person, place, and time.   Skin: He is not diaphoretic.       Labs:     Lab Results   Component Value Date     11/06/2020    K 4.4 11/06/2020     11/06/2020    CO2 27 11/06/2020    BUN 13 11/06/2020    CREATININE 1.0 11/06/2020    ANIONGAP 9 11/06/2020     Lab Results   Component Value Date    HGBA1C 5.7 (H) 11/06/2020     No results found for: BNP, BNPTRIAGEBLO    Lab Results   Component Value Date    WBC 9.61 11/06/2020    HGB 13.4 (L) 11/06/2020    HCT 43.2 11/06/2020     11/06/2020    GRAN 5.7 11/06/2020    GRAN 58.8 11/06/2020     Lab Results   Component Value Date    CHOL 139 11/06/2020    HDL 37 (L) 11/06/2020    LDLCALC 65.2 11/06/2020    TRIG 184 (H) 11/06/2020       Lab Results   Component Value Date     11/06/2020    K 4.4 11/06/2020     11/06/2020    CO2 27 11/06/2020    BUN 13 11/06/2020    CREATININE 1.0 11/06/2020    ANIONGAP 9 11/06/2020     Lab Results   Component Value Date    HGBA1C 5.7 (H) 11/06/2020     No results found for: BNP, BNPTRIAGEBLO Lab Results   Component Value Date    WBC 9.61 11/06/2020    HGB 13.4 (L) 11/06/2020    HCT 43.2 11/06/2020     11/06/2020    GRAN 5.7 11/06/2020    GRAN 58.8 11/06/2020     Lab Results   Component Value Date    CHOL 139 11/06/2020    HDL 37 (L) 11/06/2020    LDLCALC 65.2 11/06/2020    TRIG 184 (H) 11/06/2020                Cardiovascular Imaging:   EK2G: no prior ECG and no    Echo: no echo reported.     Assessment:     Suraj Mukherjee was seen today for chest pain.    Diagnoses and all orders for this visit:    Coronary artery disease of native artery of native heart with stable angina pectoris  Coronary artery calcification seen on computed tomography  Prediabetes  Arteriosclerosis of aorta  Mixed hyperlipidemia  Unstable angina    Plan:        Unstable angina with symptomatic worsening of chest pain involving native artery  of native heart   Typical symptoms and unable to continue doing his Stress echo due to inability continue due to chest pain  Given his elevated risk including age, findings of calcification on CT increase his risk of CAD  Continue Aspirin 81 mg PO daily  No contra-indication to Ticagrelol    Load with Ticagrelol 180 mg x 1 followed by Ticagrelol 90 mg BID  Continue Statin therapy     Access: Right Radial and right CFA as back up    Renal Risk Score/Predicts risk of contrast-induced nephropathy (AMPARO) after PCI:    Prior Shellfish/Contrast Iodine allergy Allergy: No   Pre Cath Med: Diphenhydramine (Benadryl) 50 mg, Oral, Once, Pre-Procedure   Pre Cath Hydration: 3 cc/kg/hr IV, continuous, for 1 hour, Pre-Procedure     Sedation   Type of sedation: RN IV sedation    Mallampati score: III   ASA score: III    Informed Consent  -The risks, benefits & alternatives of the procedure were explained to the patient.    -The risks of coronary angiography include but are not limited to: Bleeding, infection, heart rhythm abnormalities, allergic reactions, kidney injury, stroke and death.    -Should stenting be indicated, the patient has agreed to dual anti-platelet therapy for 1-consecutive year with a drug-eluting stent and a minimum of 1-month with the use of a bare metal stent.    -The risks of moderate sedation include hypotension, respiratory depression, arrhythmias, bronchospasm, & death.    -Informed consent was obtained & the patient is agreeable to proceed with the procedure.    Coronary artery calcification seen on computed tomography  - See above problem for more elaboration     Prediabetes  - Increase the risk of CAD   - We recommend aggressive control - Defer to PCP     Arteriosclerosis of aorta  - See above problem for more elaboration    Mixed hyperlipidemia  - Continue Lipitor for dyslipidemia     Signed:  Erika Leyva MD  Cardiology Fellow (PGY-V)  Pager: [phone]

## 2020-11-11 NOTE — H&P (VIEW-ONLY)
Interventional Cardiology Initial office visit     Patient: Suraj Parrish   MRN: [id number]  PCP: Samuel Klein MD   Today's date: 11/11/2020     Subjective:     Mr. Suraj Parrish, 85 y.o. male with prior history of Prediabetes, Arteriosclerosis of aorta, coronary calcification in CT. He was referred to interventional cardiology clinic for evaluating of chest pain that has been ongoing for one month. His pain is described as retrosternal chest pain started couple of months ago. It is exertional in nature and more centrally. The pain is worse when he ambulate for more than 500 feet. The pain is better when he is taking a rest. He denies dyspnea on exertion, palpitation or loss of consciousness. He underwent a treadmill test but only able to walk < 3 minutes before aborting the test for .symptomatic chest pain. The patient had a chest CT done 8 years ago that did demonstrate coronary artery calcification.  He has dyslipidemia on moderate intensity statin therapy.  His most recent LDL was 65 with low HDL.  Triglycerides are mildly elevated.    History:     Past Medical History:   Diagnosis Date    Arteriosclerosis of aorta 11/6/2020    Degeneration of intervertebral disc of lumbar region 11/6/2020    MCCULLOUGH (dyspnea on exertion) 11/6/2020    Hearing loss 11/06/2020    Hearing aid in both ears    Lumbar radiculitis 5/21/2019    Mixed hyperlipidemia 11/6/2020    Other spondylosis, lumbar region 4/11/2019    Prediabetes 11/7/2020    Restless legs syndrome 11/6/2020     Past Surgical History:   Procedure Laterality Date    APPENDECTOMY  1958    cataract Bilateral     EPIDURAL STEROID INJECTION INTO LUMBAR SPINE N/A 5/21/2019    Procedure: Injection-steroid-epidural-lumbar;  Surgeon: Valente Montague MD;  Location: [facility];  Service: Pain Management;  Laterality: N/A;  L5-S1    INJECTION OF ANESTHETIC AGENT AROUND MEDIAL BRANCH NERVES INNERVATING LUMBAR FACET JOINT Bilateral 4/11/2019    Procedure:  Block-nerve-medial branch-lumbar L3,4,5;  Surgeon: Valente Montague MD;  Location: [facility] OR;  Service: Pain Management;  Laterality: Bilateral;    RADIOFREQUENCY ABLATION OF LUMBAR MEDIAL BRANCH NERVE AT SINGLE LEVEL Bilateral 2019    Procedure: Radiofrequency Ablation, Nerve, Spinal, Lumbar, Medial Branch, L3,4,5;  Surgeon: Valente Montague MD;  Location: [facility] OR;  Service: Pain Management;  Laterality: Bilateral;  faustino multigen pain management generator  SN [id number]  80 degrees for 90 seconds     TONSILLECTOMY, ADENOIDECTOMY  194    TRANSFORAMINAL EPIDURAL INJECTION OF STEROID Left 2019    Procedure: Injection,steroid,epidural,transforaminal approach L4-5, L5-S1;  Surgeon: Valente Montague MD;  Location: [facility] OR;  Service: Pain Management;  Laterality: Left;       Social History     Tobacco Use    Smoking status: Former Smoker     Packs/day: 2.00     Years: 15.00     Pack years: 30.00     Types: Cigarettes     Quit date: 10/17/1963     Years since quittin.1    Smokeless tobacco: Never Used   Substance Use Topics    Alcohol use: Not Currently     Alcohol/week: 7.0 standard drinks     Types: 7 Glasses of wine per week     Frequency: 2-3 times a week     Comment: occasional now     Family History   Problem Relation Age of Onset    Parkinsonism Father     Heart disease Father        Meds:   Review of patient's allergies indicates:  No Known Allergies    Current Outpatient Medications:     aspirin (ECOTRIN) 81 MG EC tablet, 81 mg., Disp: , Rfl:     atorvastatin (LIPITOR) 20 MG tablet, Take 1 tablet (20 mg total) by mouth once daily., Disp: 90 tablet, Rfl: 3    cyanocobalamin (VITAMIN B-12) 1000 MCG tablet, 1,000 mcg., Disp: , Rfl:     isosorbide dinitrate (ISORDIL) 30 MG Tab, Take 15 mg by mouth once daily. Pt taken 1/2 tablet daily, Disp: , Rfl:     metoprolol succinate (TOPROL-XL) 25 MG 24 hr tablet, Take 12.5 mg by mouth 2 (two) times daily., Disp: , Rfl:     vitamins  A,C,E-zinc-copper (ICAPS  "AREDS) 14,339-226-200 unit-mg-unit Cap, Take by mouth., Disp: , Rfl:     nitroGLYCERIN (NITROSTAT) 0.4 MG SL tablet, Place 1 tablet (0.4 mg total) under the tongue every 5 (five) minutes as needed for Chest pain. (Patient not taking: Reported on 11/11/2020), Disp: 10 tablet, Rfl: 12      Review of Systems   Constitutional: Negative for fever and malaise/fatigue.   HENT: Negative for ear discharge and hearing loss.    Eyes: Negative for blurred vision and double vision.   Respiratory: Positive for shortness of breath (with chest pain). Negative for cough, hemoptysis and sputum production.    Cardiovascular: Positive for chest pain. Negative for palpitations, claudication, leg swelling and PND.   Gastrointestinal: Negative for abdominal pain and constipation.   Genitourinary: Negative for dysuria and urgency.       Objective:   /74 (BP Location: Right arm, Patient Position: Sitting, BP Method: Large (Automatic))   Pulse 67   Ht 5' 7.2" (1.707 m)   Wt 74.8 kg (164 lb 14.5 oz)   SpO2 99%   BMI 25.67 kg/m²   Physical Exam   Constitutional: He is oriented to person, place, and time and well-developed, well-nourished, and in no distress. No distress.   HENT:   Head: Normocephalic and atraumatic.   Right Ear: External ear normal.   Left Ear: External ear normal.   Eyes: Pupils are equal, round, and reactive to light. Right eye exhibits no discharge. Left eye exhibits no discharge.   Neck: Normal range of motion. Neck supple. No thyromegaly present.   Cardiovascular: Normal rate and regular rhythm. Exam reveals no friction rub.   No murmur heard.  Pulses:       Radial pulses are 2+ on the right side and 2+ on the left side.        Dorsalis pedis pulses are 1+ on the right side and 1+ on the left side.        Posterior tibial pulses are 1+ on the right side and 1+ on the left side.   Pulmonary/Chest: Effort normal and breath sounds normal. No respiratory distress. He has no wheezes.   Musculoskeletal: Normal range " of motion.         General: No deformity or edema.   Neurological: He is alert and oriented to person, place, and time.   Skin: He is not diaphoretic.       Labs:     Lab Results   Component Value Date     11/06/2020    K 4.4 11/06/2020     11/06/2020    CO2 27 11/06/2020    BUN 13 11/06/2020    CREATININE 1.0 11/06/2020    ANIONGAP 9 11/06/2020     Lab Results   Component Value Date    HGBA1C 5.7 (H) 11/06/2020     No results found for: BNP, BNPTRIAGEBLO    Lab Results   Component Value Date    WBC 9.61 11/06/2020    HGB 13.4 (L) 11/06/2020    HCT 43.2 11/06/2020     11/06/2020    GRAN 5.7 11/06/2020    GRAN 58.8 11/06/2020     Lab Results   Component Value Date    CHOL 139 11/06/2020    HDL 37 (L) 11/06/2020    LDLCALC 65.2 11/06/2020    TRIG 184 (H) 11/06/2020       Lab Results   Component Value Date     11/06/2020    K 4.4 11/06/2020     11/06/2020    CO2 27 11/06/2020    BUN 13 11/06/2020    CREATININE 1.0 11/06/2020    ANIONGAP 9 11/06/2020     Lab Results   Component Value Date    HGBA1C 5.7 (H) 11/06/2020     No results found for: BNP, BNPTRIAGEBLO Lab Results   Component Value Date    WBC 9.61 11/06/2020    HGB 13.4 (L) 11/06/2020    HCT 43.2 11/06/2020     11/06/2020    GRAN 5.7 11/06/2020    GRAN 58.8 11/06/2020     Lab Results   Component Value Date    CHOL 139 11/06/2020    HDL 37 (L) 11/06/2020    LDLCALC 65.2 11/06/2020    TRIG 184 (H) 11/06/2020                Cardiovascular Imaging:   EK2G: no prior ECG and no    Echo: no echo reported.     Assessment:     Suraj Mukherjee was seen today for chest pain.    Diagnoses and all orders for this visit:    Coronary artery disease of native artery of native heart with stable angina pectoris  Coronary artery calcification seen on computed tomography  Prediabetes  Arteriosclerosis of aorta  Mixed hyperlipidemia  Unstable angina    Plan:        Unstable angina with symptomatic worsening of chest pain involving native artery  of native heart   Typical symptoms and unable to continue doing his Stress echo due to inability continue due to chest pain  Given his elevated risk including age, findings of calcification on CT increase his risk of CAD  Continue Aspirin 81 mg PO daily  No contra-indication to Ticagrelol    Load with Ticagrelol 180 mg x 1 followed by Ticagrelol 90 mg BID  Continue Statin therapy     Access: Right Radial and right CFA as back up    Renal Risk Score/Predicts risk of contrast-induced nephropathy (AMPARO) after PCI:    Prior Shellfish/Contrast Iodine allergy Allergy: No   Pre Cath Med: Diphenhydramine (Benadryl) 50 mg, Oral, Once, Pre-Procedure   Pre Cath Hydration: 3 cc/kg/hr IV, continuous, for 1 hour, Pre-Procedure     Sedation   Type of sedation: RN IV sedation    Mallampati score: III   ASA score: III    Informed Consent  -The risks, benefits & alternatives of the procedure were explained to the patient.    -The risks of coronary angiography include but are not limited to: Bleeding, infection, heart rhythm abnormalities, allergic reactions, kidney injury, stroke and death.    -Should stenting be indicated, the patient has agreed to dual anti-platelet therapy for 1-consecutive year with a drug-eluting stent and a minimum of 1-month with the use of a bare metal stent.    -The risks of moderate sedation include hypotension, respiratory depression, arrhythmias, bronchospasm, & death.    -Informed consent was obtained & the patient is agreeable to proceed with the procedure.    Coronary artery calcification seen on computed tomography  - See above problem for more elaboration     Prediabetes  - Increase the risk of CAD   - We recommend aggressive control - Defer to PCP     Arteriosclerosis of aorta  - See above problem for more elaboration    Mixed hyperlipidemia  - Continue Lipitor for dyslipidemia     Signed:  Erika Leyva MD  Cardiology Fellow (PGY-V)  Pager: [phone]

## 2020-11-11 NOTE — PROGRESS NOTES
Subjective:   Patient ID:  Suraj Parrish is a 85 y.o. male who presents for evaluation of Coronary artery disease of native artery of native heart wit, Arteriosclerosis of aorta, and MCCULLOUGH (dyspnea on exertion)      HPI:   The patient presents at this time for evaluation of a chest pain on exertion.  One month ago the patient was walking and developed the onset of anterior burning chest pain that began on his right side and then it spread across his entire chest.  From that point on, every time that he would walk he would develop this same discomfort that ultimately limited his ability to walk.  If he stops the discomfort subsided.  The patient was sent to a local cardiologist to did an echocardiogram and apparently a stress echocardiogram both were reported as negative.  He was placed on nitrates and a beta blocker with persistence of his exertional symptoms.  Of he home on a will be it is  Because of his persistent symptoms, an angiogram was recommended that has not as yet been performed.  He has not had rest symptoms.  The patient had a chest CT done 8 years ago that did demonstrate coronary artery calcification.  He has dyslipidemia on moderate intensity statin therapy.  His most recent LDL was 65 with low HDL.  Triglycerides are mildly elevated.  Review of Systems   Constitution: Negative for malaise/fatigue, weight gain and weight loss.   Eyes: Negative for blurred vision.   Cardiovascular: Positive for chest pain. Negative for claudication, cyanosis, dyspnea on exertion, irregular heartbeat, leg swelling, near-syncope, orthopnea, palpitations, paroxysmal nocturnal dyspnea and syncope.   Respiratory: Negative for cough, shortness of breath and wheezing.    Musculoskeletal: Positive for back pain. Negative for falls and myalgias.   Gastrointestinal: Negative for abdominal pain, heartburn, nausea and vomiting.   Genitourinary: Negative for nocturia.   Neurological: Negative for brief paralysis, dizziness,  "focal weakness, headaches, numbness, paresthesias and weakness.   Psychiatric/Behavioral: Negative for altered mental status.     Social History  Suraj Mukherjee reports that he quit smoking about 57 years ago. His smoking use included cigarettes. He has a 30.00 pack-year smoking history. He has never used smokeless tobacco. He reports current alcohol use of about 7.0 standard drinks of alcohol per week. He reports that he does not use drugs.  Current Outpatient Medications   Medication Sig    aspirin (ECOTRIN) 81 MG EC tablet 81 mg.    atorvastatin (LIPITOR) 20 MG tablet Take 1 tablet (20 mg total) by mouth once daily.    cyanocobalamin (VITAMIN B-12) 1000 MCG tablet 1,000 mcg.    isosorbide dinitrate (ISORDIL) 30 MG Tab Take 15 mg by mouth once daily. Pt taken 1/2 tablet daily    metoprolol succinate (TOPROL-XL) 25 MG 24 hr tablet Take 12.5 mg by mouth 2 (two) times daily.    nitroGLYCERIN (NITROSTAT) 0.4 MG SL tablet Place 1 tablet (0.4 mg total) under the tongue every 5 (five) minutes as needed for Chest pain.    vitamins  A,C,E-zinc-copper (ICAPS AREDS) 14,320-226-200 unit-mg-unit Cap Take by mouth.     No current facility-administered medications for this visit.      Objective:   Physical Exam   Constitutional: He is oriented to person, place, and time. He appears well-developed. No distress.   /66 (BP Location: Left arm, Patient Position: Sitting, BP Method: Medium (Automatic))   Pulse 65   Ht 5' 7.5" (1.715 m)   Wt 75.6 kg (166 lb 10.7 oz)   SpO2 98%   BMI 25.72 kg/m²    HENT:   Head: Normocephalic.   Right Ear: External ear normal.   Left Ear: External ear normal.   Eyes: Pupils are equal, round, and reactive to light. EOM are normal. No scleral icterus.   Neck: Neck supple. No JVD present. No thyromegaly present.   Cardiovascular: Normal rate, regular rhythm, normal heart sounds and intact distal pulses. PMI is not displaced. Exam reveals no gallop and no friction rub.   No murmur " heard.  Pulmonary/Chest: Effort normal and breath sounds normal. No respiratory distress. He has no wheezes. He has no rales.   Abdominal: Soft. He exhibits no distension. There is no hepatosplenomegaly. There is no abdominal tenderness.   Musculoskeletal:         General: No tenderness or edema.      Comments: Gait normal   Neurological: He is alert and oriented to person, place, and time.   Skin: Skin is warm and dry. No rash noted.   Psychiatric: He has a normal mood and affect. His behavior is normal.       Lab Results   Component Value Date     11/06/2020    K 4.4 11/06/2020     11/06/2020    CO2 27 11/06/2020    BUN 13 11/06/2020    CREATININE 1.0 11/06/2020    GLU 89 11/06/2020    HGBA1C 5.7 (H) 11/06/2020    AST 25 11/06/2020    ALT 21 11/06/2020    ALBUMIN 4.1 11/06/2020    PROT 7.4 11/06/2020    BILITOT 0.4 11/06/2020    WBC 9.61 11/06/2020    HGB 13.4 (L) 11/06/2020    HCT 43.2 11/06/2020    MCV 99 (H) 11/06/2020     11/06/2020    TSH 2.952 11/06/2020    CHOL 139 11/06/2020    HDL 37 (L) 11/06/2020    LDLCALC 65.2 11/06/2020    TRIG 184 (H) 11/06/2020       Assessment:     1. Chest pain on exertion:  Despite the were reported negative stress echocardiogram, symptoms are very concerning for angina pectoris due to CAD.     2. Mixed hyperlipidemia all:  On moderate intensity statin with LDL 65.   3 Coronary artery calcification seen on computed tomography       Plan:     Suraj Mukherjee was seen today for coronary artery disease of native artery of native heart wit, arteriosclerosis of aorta and merritt (dyspnea on exertion).    Diagnoses and all orders for this visit:    Chest pain on exertion  Had will refer to interventional Cardiology (patient to be seen today).  Coronary artery calcification seen on computed tomography    Mixed hyperlipidemia  Continue current regimen for the time being          Other orders  -     metoprolol succinate (TOPROL-XL) 25 MG 24 hr tablet; Take 12.5 mg by mouth  2 (two) times daily.  -     isosorbide dinitrate (ISORDIL) 30 MG Tab; Take 15 mg by mouth once daily. Pt taken 1/2 tablet daily

## 2020-11-12 ENCOUNTER — HOSPITAL ENCOUNTER (OUTPATIENT)
Facility: HOSPITAL | Age: 85
Discharge: HOME | End: 2020-11-12
Attending: INTERNAL MEDICINE | Admitting: INTERNAL MEDICINE
Payer: MEDICARE

## 2020-11-12 VITALS
TEMPERATURE: 97 F | WEIGHT: 164 LBS | DIASTOLIC BLOOD PRESSURE: 88 MMHG | SYSTOLIC BLOOD PRESSURE: 158 MMHG | OXYGEN SATURATION: 98 % | HEIGHT: 67 IN | BODY MASS INDEX: 25.74 KG/M2 | RESPIRATION RATE: 16 BRPM | HEART RATE: 59 BPM

## 2020-11-12 DIAGNOSIS — I25.118 CORONARY ARTERY DISEASE OF NATIVE ARTERY OF NATIVE HEART WITH STABLE ANGINA PECTORIS: ICD-10-CM

## 2020-11-12 DIAGNOSIS — I25.10 CAD (CORONARY ARTERY DISEASE): ICD-10-CM

## 2020-11-12 DIAGNOSIS — I20.0 UNSTABLE ANGINA: ICD-10-CM

## 2020-11-12 LAB
ABO + RH BLD: NORMAL
ANION GAP SERPL CALC-SCNC: 9 MMOL/L (ref 8–16)
BASOPHILS # BLD AUTO: 0.03 K/UL (ref 0–0.2)
BASOPHILS NFR BLD: 0.4 % (ref 0–1.9)
BLD GP AB SCN CELLS X3 SERPL QL: NORMAL
BUN SERPL-MCNC: 14 MG/DL (ref 8–23)
CALCIUM SERPL-MCNC: 8.9 MG/DL (ref 8.7–10.5)
CHLORIDE SERPL-SCNC: 106 MMOL/L (ref 95–110)
CO2 SERPL-SCNC: 26 MMOL/L (ref 23–29)
CREAT SERPL-MCNC: 0.9 MG/DL (ref 0.5–1.4)
DIFFERENTIAL METHOD: ABNORMAL
EOSINOPHIL # BLD AUTO: 0.2 K/UL (ref 0–0.5)
EOSINOPHIL NFR BLD: 2.8 % (ref 0–8)
ERYTHROCYTE [DISTWIDTH] IN BLOOD BY AUTOMATED COUNT: 12.4 % (ref 11.5–14.5)
EST. GFR  (AFRICAN AMERICAN): >60 ML/MIN/1.73 M^2
EST. GFR  (NON AFRICAN AMERICAN): >60 ML/MIN/1.73 M^2
GLUCOSE SERPL-MCNC: 92 MG/DL (ref 70–110)
HCT VFR BLD AUTO: 43.9 % (ref 40–54)
HGB BLD-MCNC: 13.9 G/DL (ref 14–18)
IMM GRANULOCYTES # BLD AUTO: 0.02 K/UL (ref 0–0.04)
IMM GRANULOCYTES NFR BLD AUTO: 0.3 % (ref 0–0.5)
INR PPP: 1 (ref 0.8–1.2)
LYMPHOCYTES # BLD AUTO: 1.8 K/UL (ref 1–4.8)
LYMPHOCYTES NFR BLD: 24 % (ref 18–48)
MCH RBC QN AUTO: 30.6 PG (ref 27–31)
MCHC RBC AUTO-ENTMCNC: 31.7 G/DL (ref 32–36)
MCV RBC AUTO: 97 FL (ref 82–98)
MONOCYTES # BLD AUTO: 0.7 K/UL (ref 0.3–1)
MONOCYTES NFR BLD: 9 % (ref 4–15)
NEUTROPHILS # BLD AUTO: 4.8 K/UL (ref 1.8–7.7)
NEUTROPHILS NFR BLD: 63.5 % (ref 38–73)
NRBC BLD-RTO: 0 /100 WBC
PLATELET # BLD AUTO: 266 K/UL (ref 150–350)
PMV BLD AUTO: 10.1 FL (ref 9.2–12.9)
POTASSIUM SERPL-SCNC: 4.7 MMOL/L (ref 3.5–5.1)
PROTHROMBIN TIME: 10.6 SEC (ref 9–12.5)
RBC # BLD AUTO: 4.54 M/UL (ref 4.6–6.2)
SARS-COV-2 RNA RESP QL NAA+PROBE: NOT DETECTED
SODIUM SERPL-SCNC: 141 MMOL/L (ref 136–145)
WBC # BLD AUTO: 7.55 K/UL (ref 3.9–12.7)

## 2020-11-12 PROCEDURE — 86901 BLOOD TYPING SEROLOGIC RH(D): CPT

## 2020-11-12 PROCEDURE — 99153 MOD SED SAME PHYS/QHP EA: CPT | Performed by: INTERNAL MEDICINE

## 2020-11-12 PROCEDURE — 25500020 PHARM REV CODE 255: Performed by: INTERNAL MEDICINE

## 2020-11-12 PROCEDURE — 99152 MOD SED SAME PHYS/QHP 5/>YRS: CPT | Mod: GC,,, | Performed by: INTERNAL MEDICINE

## 2020-11-12 PROCEDURE — 80048 BASIC METABOLIC PNL TOTAL CA: CPT

## 2020-11-12 PROCEDURE — C1894 INTRO/SHEATH, NON-LASER: HCPCS | Performed by: INTERNAL MEDICINE

## 2020-11-12 PROCEDURE — 25000003 PHARM REV CODE 250: Performed by: INTERNAL MEDICINE

## 2020-11-12 PROCEDURE — 93458 PR CATH PLACE/CORON ANGIO, IMG SUPER/INTERP,W LEFT HEART VENTRICULOGRAPHY: ICD-10-PCS | Mod: 26,GC,, | Performed by: INTERNAL MEDICINE

## 2020-11-12 PROCEDURE — 25000003 PHARM REV CODE 250: Performed by: STUDENT IN AN ORGANIZED HEALTH CARE EDUCATION/TRAINING PROGRAM

## 2020-11-12 PROCEDURE — 85610 PROTHROMBIN TIME: CPT

## 2020-11-12 PROCEDURE — 93010 EKG 12-LEAD: ICD-10-PCS | Mod: ,,, | Performed by: INTERNAL MEDICINE

## 2020-11-12 PROCEDURE — 99152 MOD SED SAME PHYS/QHP 5/>YRS: CPT | Performed by: INTERNAL MEDICINE

## 2020-11-12 PROCEDURE — 93458 L HRT ARTERY/VENTRICLE ANGIO: CPT | Mod: GC | Performed by: INTERNAL MEDICINE

## 2020-11-12 PROCEDURE — 93458 L HRT ARTERY/VENTRICLE ANGIO: CPT | Mod: 26,GC,, | Performed by: INTERNAL MEDICINE

## 2020-11-12 PROCEDURE — 63600175 PHARM REV CODE 636 W HCPCS: Performed by: INTERNAL MEDICINE

## 2020-11-12 PROCEDURE — 93005 ELECTROCARDIOGRAM TRACING: CPT | Mod: 59

## 2020-11-12 PROCEDURE — 99152 PR MOD CONSCIOUS SEDATION, SAME PHYS, 5+ YRS, FIRST 15 MIN: ICD-10-PCS | Mod: GC,,, | Performed by: INTERNAL MEDICINE

## 2020-11-12 PROCEDURE — 85025 COMPLETE CBC W/AUTO DIFF WBC: CPT

## 2020-11-12 PROCEDURE — 93010 ELECTROCARDIOGRAM REPORT: CPT | Mod: ,,, | Performed by: INTERNAL MEDICINE

## 2020-11-12 PROCEDURE — C1769 GUIDE WIRE: HCPCS | Performed by: INTERNAL MEDICINE

## 2020-11-12 RX ORDER — DIPHENHYDRAMINE HCL 50 MG
50 CAPSULE ORAL ONCE
Status: COMPLETED | OUTPATIENT
Start: 2020-11-12 | End: 2020-11-12

## 2020-11-12 RX ORDER — METOPROLOL TARTRATE 25 MG/1
25 TABLET, FILM COATED ORAL 2 TIMES DAILY
Qty: 180 TABLET | Refills: 3 | Status: SHIPPED | OUTPATIENT
Start: 2020-11-12 | End: 2021-09-30 | Stop reason: SDUPTHER

## 2020-11-12 RX ORDER — MIDAZOLAM HYDROCHLORIDE 1 MG/ML
INJECTION, SOLUTION INTRAMUSCULAR; INTRAVENOUS
Status: DISCONTINUED | OUTPATIENT
Start: 2020-11-12 | End: 2020-11-12 | Stop reason: HOSPADM

## 2020-11-12 RX ORDER — FENTANYL CITRATE 50 UG/ML
INJECTION, SOLUTION INTRAMUSCULAR; INTRAVENOUS
Status: DISCONTINUED | OUTPATIENT
Start: 2020-11-12 | End: 2020-11-12 | Stop reason: HOSPADM

## 2020-11-12 RX ORDER — SODIUM CHLORIDE 9 MG/ML
3 INJECTION, SOLUTION INTRAVENOUS CONTINUOUS
Status: ACTIVE | OUTPATIENT
Start: 2020-11-12 | End: 2020-11-12

## 2020-11-12 RX ORDER — HEPARIN SODIUM 1000 [USP'U]/ML
INJECTION, SOLUTION INTRAVENOUS; SUBCUTANEOUS
Status: DISCONTINUED | OUTPATIENT
Start: 2020-11-12 | End: 2020-11-12 | Stop reason: HOSPADM

## 2020-11-12 RX ORDER — SODIUM CHLORIDE 9 MG/ML
INJECTION, SOLUTION INTRAVENOUS CONTINUOUS
Status: DISCONTINUED | OUTPATIENT
Start: 2020-11-12 | End: 2020-11-12 | Stop reason: HOSPADM

## 2020-11-12 RX ORDER — ACETAMINOPHEN 325 MG/1
650 TABLET ORAL EVERY 4 HOURS PRN
Status: DISCONTINUED | OUTPATIENT
Start: 2020-11-12 | End: 2020-11-12 | Stop reason: HOSPADM

## 2020-11-12 RX ORDER — NAPROXEN SODIUM 220 MG/1
81 TABLET, FILM COATED ORAL DAILY
Status: DISCONTINUED | OUTPATIENT
Start: 2020-11-12 | End: 2020-11-12 | Stop reason: HOSPADM

## 2020-11-12 RX ORDER — ISOSORBIDE MONONITRATE 30 MG/1
30 TABLET, EXTENDED RELEASE ORAL DAILY
Qty: 90 TABLET | Refills: 0 | Status: SHIPPED | OUTPATIENT
Start: 2020-11-12 | End: 2021-02-09 | Stop reason: SDUPTHER

## 2020-11-12 RX ORDER — ONDANSETRON 8 MG/1
8 TABLET, ORALLY DISINTEGRATING ORAL EVERY 8 HOURS PRN
Status: DISCONTINUED | OUTPATIENT
Start: 2020-11-12 | End: 2020-11-12 | Stop reason: HOSPADM

## 2020-11-12 RX ADMIN — DIPHENHYDRAMINE HYDROCHLORIDE 50 MG: 50 CAPSULE ORAL at 09:11

## 2020-11-12 RX ADMIN — SODIUM CHLORIDE 3 ML/KG/HR: 0.9 INJECTION, SOLUTION INTRAVENOUS at 09:11

## 2020-11-12 RX ADMIN — ASPIRIN 81 MG: 81 TABLET, CHEWABLE ORAL at 10:11

## 2020-11-12 NOTE — DISCHARGE SUMMARY
[facility] - Short Stay Cardiac Unit  Interventional Cardiology  Discharge Summary      Patient Name: Suraj Parrish  MRN: [id number]  Admission Date: 11/12/2020  Hospital Length of Stay: 0 days  Discharge Date and Time:  11/12/2020 1:04 PM  Attending Physician: Panfilo Dawson MD  Discharging Provider: Kristian Leyva MD  Primary Care Physician: Samuel Klein MD    HPI:  Mr. Suraj Parrish, 85 y.o. male with prior history of Prediabetes, Arteriosclerosis of aorta, coronary calcification in CT. He was referred to interventional cardiology clinic for evaluating of chest pain that has been ongoing for one month. His pain is described as retrosternal chest pain started couple of months ago. It is exertional in nature and more centrally. The pain is worse when he ambulate for more than 500 feet. The pain is better when he is taking a rest. He denies dyspnea on exertion, palpitation or loss of consciousness. He underwent a treadmill test but only able to walk < 3 minutes before aborting the test for .symptomatic chest pain. The patient had a chest CT done 8 years ago that did demonstrate coronary artery calcification.  He has dyslipidemia on moderate intensity statin therapy.  His most recent LDL was 65 with low HDL.  Triglycerides are mildly elevated.    Procedure(s) (LRB):  Left heart cath (Left)     Hospital Course:   - Routine post-cath care  - Single proximal ramus (or early D1) vessel disease               No intervention      - LVEDP 25 mmHg     - Optimize medical therapy as follows:              - Metoprolol tartrate 25 mg PO BID               - Isosorbide mono nitrite 30 mg daily      - Continue Asprin 81 mg PO daily, no need for Ticagrelol      - EKG when arrives to recovery .   - Routine post cath protocol.         Significant Diagnostic Studies: Labs:   CMP   Recent Labs   Lab 11/12/20  0856      K 4.7      CO2 26   GLU 92   BUN 14   CREATININE 0.9   CALCIUM 8.9   ANIONGAP 9    ESTGFRAFRICA >60.0   EGFRNONAA >60.0    and CBC   Recent Labs   Lab 11/12/20  0856   WBC 7.55   HGB 13.9*   HCT 43.9          Pending Diagnostic Studies:     None          Discharged Condition: stable    Follow Up:  One month follow up with Dr. Dawson.     Medications:  Reconciled Home Medications:      Medication List      START taking these medications    isosorbide mononitrate 30 MG 24 hr tablet  Commonly known as: IMDUR  Take 1 tablet (30 mg total) by mouth once daily.     metoprolol tartrate 25 MG tablet  Commonly known as: LOPRESSOR  Take 1 tablet (25 mg total) by mouth 2 (two) times daily.        CONTINUE taking these medications    aspirin 81 MG EC tablet  Commonly known as: ECOTRIN  81 mg.     atorvastatin 20 MG tablet  Commonly known as: LIPITOR  Take 1 tablet (20 mg total) by mouth once daily.     cyanocobalamin 1000 MCG tablet  Commonly known as: VITAMIN B-12  1,000 mcg.     ICAPS AREDS 14,320-226-200 unit-mg-unit Cap  Generic drug: vitamins  A,C,E-zinc-copper  Take by mouth.     nitroGLYCERIN 0.4 MG SL tablet  Commonly known as: NITROSTAT  Place 1 tablet (0.4 mg total) under the tongue every 5 (five) minutes as needed for Chest pain.        STOP taking these medications    BRILINTA 90 mg tablet  Generic drug: ticagrelor     isosorbide dinitrate 30 MG Tab  Commonly known as: ISORDIL     metoprolol succinate 25 MG 24 hr tablet  Commonly known as: TOPROL-XL            Time spent on the discharge of patient: 35 minutes    Kristian Leyva MD  Interventional Cardiology  [facility] Stay Cardiac [facility]

## 2020-11-12 NOTE — Clinical Note
The catheter is repositioned ostium   left main. Angiography performed of the left coronary arteries in multiple views.

## 2020-11-12 NOTE — PLAN OF CARE
Received via stretcher from procedure.  Report from LILLI Dominguez.  Awake and alert.  No c/o pain or SOB at present.  vasc band in place to right wrist intact.  No bleeding or hematoma noted to right wrist vasc band site.  Oriented to room and food, drinks, call bell provided.  Sister called to bedside.  Will continue to monitor.

## 2020-11-12 NOTE — NURSING
IV's d/c'd x 2 with cath tips intact.  Pt and pt sister verbalized understanding of d/c instructions.  Gauze/tegaderm to right wrist remains clean dry and intact.  Sister picked up new meds from [facility].  Off unit via wheelchair for discharge home.

## 2020-11-12 NOTE — DISCHARGE INSTRUCTIONS
Stop taking Ticagrelol     Take MEtoprolol tartrate 25 twice daily   Imdur 30 mg daily     Cath discharge instructions:  1. Do not strain or lift anything greater than 4 lbs to wrist that was accessed.  2. Do not drive or operate any dangerous machinery for 24 hours.  3. Keep the dressing on, clean, and dry for 24 hours.  4. After 24 hours, the dressing may be removed and a shower is allowed.  5. Clean the area with mild soap and water and then cover it with a bandage.  6. Once the skin has healed, bathing in a tub or swimming is allowed.  7. Inspect the access site daily and report to the physician any swelling at the site that  cannot be controlled with manual pressure for 10 minutes, unusual pain at the  access site or affected extremity, unusual swelling at the access site, or signs or  symptoms of infection such as redness, pain, or fever.  Call 911 if you have:  Bleeding from the puncture site that you cannot stop by doing the following:  Keep your wrist straight and apply firm pressure to the site using your fingers and a gauze pad. Keep the pressure on for 20 minutes. Continue this until the bleeding stops. This may take awhile. When bleeding stops, cover the site with a sterile bandage and keep your wrist still as much as possible.

## 2020-11-12 NOTE — INTERVAL H&P NOTE
The patient has been examined and the H&P has been reviewed:    I concur with the findings and no changes have occurred since H&P was written.     Loaded with Ticagrelol 180 mg yesterday. And he is already on Aspirin 81 mg PO daily.     Anesthesia/Surgery risks, benefits and alternative options discussed and understood by patient/family.          Active Hospital Problems    Diagnosis  POA    Coronary artery disease of native artery of native heart with stable angina pectoris [I25.118]  Yes      Resolved Hospital Problems   No resolved problems to display.

## 2020-11-12 NOTE — HOSPITAL COURSE
- Routine post-cath care  - Single proximal ramus (or early D1) vessel disease               No intervention      - LVEDP 25 mmHg     - Optimize medical therapy as follows:              - Metoprolol tartrate 25 mg PO BID               - Isosorbide mono nitrite 30 mg daily      - Continue Asprin 81 mg PO daily, no need for Ticagrelol      - EKG when arrives to recovery .   - Routine post cath protocol.

## 2020-11-12 NOTE — PLAN OF CARE
Ambulated on unit this morning with wife at pt side.  Verbalized understanding of procedure.  Denies pain or SOB.  Resp even and unlabored.  Oriented to unit and call bell provided.  Will continue to monitor.

## 2020-11-12 NOTE — NURSING
vasc band removed from right wrist.  No bleeding or hematoma noted.  Gauze and tegaderm applied.  Will continue to monitor

## 2020-11-12 NOTE — OP NOTE
"    Post Cath Note  Referring Physician: Panfilo Dawson MD  Procedure: Left heart cath (Left)    Findings:   Access: Right radial    Single proximal ramus (or early D1) vessel disease    No intervention     LVEDP 25 mmHg    Closure device: Radial band    Post Cath Exam:   BP (!) 148/77 (BP Location: Left arm, Patient Position: Lying)   Pulse 70   Temp 97.4 °F (36.3 °C)   Resp 16   Ht 5' 7.2" (1.707 m)   Wt 74.4 kg (164 lb)   SpO2 98%   BMI 25.53 kg/m²   No unusual pain, hematoma, thrill or bruit at vascular access site.  Distal pulse present without signs of ischemia.    Recommendations:     - Routine post-cath care  - Single proximal ramus (or early D1) vessel disease    No intervention     - LVEDP 25 mmHg    - Optimize medical therapy as follows:   - Metoprolol tartrate 25 mg PO BID    - Isosorbide mono nitrite 20 mg daily    - Continue Asprin 81 mg PO daily     - EKG when arrives to recovery .   - Routine post cath protocol.     - IVF at 1.5 cc/kg/hr for 4 hrs    Erika Leyva MD  Cardiology Fellow (PGY-V)  Pager: [phone]  "

## 2020-11-25 ENCOUNTER — TELEPHONE (OUTPATIENT)
Dept: ADMINISTRATIVE | Facility: CLINIC | Age: 85
End: 2020-11-25

## 2021-02-09 RX ORDER — ISOSORBIDE MONONITRATE 30 MG/1
30 TABLET, EXTENDED RELEASE ORAL DAILY
Qty: 90 TABLET | Refills: 2 | Status: SHIPPED | OUTPATIENT
Start: 2021-02-09 | End: 2021-05-10

## 2022-01-26 ENCOUNTER — PATIENT MESSAGE (OUTPATIENT)
Dept: ADMINISTRATIVE | Facility: HOSPITAL | Age: 87
End: 2022-01-26
Payer: MEDICARE

## 2022-03-16 ENCOUNTER — PATIENT MESSAGE (OUTPATIENT)
Dept: ADMINISTRATIVE | Facility: HOSPITAL | Age: 87
End: 2022-03-16
Payer: MEDICARE

## 2023-08-16 ENCOUNTER — PATIENT MESSAGE (OUTPATIENT)
Dept: INTERNAL MEDICINE | Facility: CLINIC | Age: 88
End: 2023-08-16
Payer: MEDICARE

## (undated) DEVICE — APPLICATOR CHLORAPREP CLR 10.5

## (undated) DEVICE — NDL SAFETY 25G X 1.5 ECLIPSE

## (undated) DEVICE — OMNIPAQUE 350 200ML

## (undated) DEVICE — GLOVE PROTEXIS PI CLASSIC 7.5

## (undated) DEVICE — SYS LABEL CORRECT MED

## (undated) DEVICE — HEMOSTAT VASC BAND REG 24CM

## (undated) DEVICE — GUIDE WIRE LOC

## (undated) DEVICE — GUIDE WIRE WHOLLY FLOPPY

## (undated) DEVICE — NDL TUOHY EPIDURAL 20G X 3.5

## (undated) DEVICE — NDL HYPODERMIC BLUNT 18G 1.5IN

## (undated) DEVICE — KIT CUSTOM MANIFOLD

## (undated) DEVICE — PROTECTION STATION PLUS

## (undated) DEVICE — SEE MEDLINE ITEM 156894

## (undated) DEVICE — SPIKE CONTRAST CONTROLLER

## (undated) DEVICE — CATH FL 3.5 5FR

## (undated) DEVICE — SYR 10CC LUER LOCK

## (undated) DEVICE — WIRE GUIDE SAFE-T-J .035 260CM

## (undated) DEVICE — SYR DISP LL 5CC

## (undated) DEVICE — TUBING MINIBORE EXTENSION

## (undated) DEVICE — SYR GLASS 5CC LUER LOK

## (undated) DEVICE — NDL SPINAL 25GX3.5 SPINOCAN

## (undated) DEVICE — GLOVE SURG ULTRA TOUCH 6

## (undated) DEVICE — SHEET DRAPE MEDIUM

## (undated) DEVICE — GLOVE SURGEONS ULTRA TOUCH 6.5

## (undated) DEVICE — SEE MEDLINE ITEM 157187

## (undated) DEVICE — NDL SPINAL SPINOCAN 22GX3.5

## (undated) DEVICE — GLOVE SURG ULTRA TOUCH 7.5

## (undated) DEVICE — KIT GLIDESHEATH SLEND 6FR 10CM

## (undated) DEVICE — CATH IMPULSE 5F 100CM FR4